# Patient Record
Sex: MALE | Race: WHITE | Employment: OTHER | URBAN - METROPOLITAN AREA
[De-identification: names, ages, dates, MRNs, and addresses within clinical notes are randomized per-mention and may not be internally consistent; named-entity substitution may affect disease eponyms.]

---

## 2017-03-03 ENCOUNTER — HOSPITAL ENCOUNTER (OUTPATIENT)
Dept: ULTRASOUND IMAGING | Facility: CLINIC | Age: 72
Discharge: HOME | End: 2017-03-03
Attending: FAMILY MEDICINE | Admitting: FAMILY MEDICINE
Payer: MEDICARE

## 2017-03-03 DIAGNOSIS — N50.89 MASS OF RIGHT TESTICLE: ICD-10-CM

## 2017-03-03 PROCEDURE — 93976 VASCULAR STUDY: CPT

## 2019-09-11 ENCOUNTER — MEDICAL CORRESPONDENCE (OUTPATIENT)
Dept: HEALTH INFORMATION MANAGEMENT | Facility: CLINIC | Age: 74
End: 2019-09-11

## 2019-10-01 ENCOUNTER — HOSPITAL ENCOUNTER (OUTPATIENT)
Dept: CARDIOLOGY | Facility: CLINIC | Age: 74
Discharge: HOME | End: 2019-10-01
Attending: FAMILY MEDICINE | Admitting: FAMILY MEDICINE
Payer: MEDICARE

## 2019-10-01 DIAGNOSIS — E78.5 HYPERLIPIDEMIA: ICD-10-CM

## 2019-10-01 PROCEDURE — 75571 CT HRT W/O DYE W/CA TEST: CPT | Mod: GA

## 2019-10-01 PROCEDURE — 75571 CT HRT W/O DYE W/CA TEST: CPT | Mod: 26 | Performed by: INTERNAL MEDICINE

## 2021-05-19 ENCOUNTER — APPOINTMENT (OUTPATIENT)
Dept: URBAN - METROPOLITAN AREA CLINIC 253 | Age: 76
Setting detail: DERMATOLOGY
End: 2021-05-21

## 2021-05-19 VITALS — RESPIRATION RATE: 14 BRPM | HEIGHT: 72 IN | WEIGHT: 235 LBS

## 2021-05-19 DIAGNOSIS — D36.1 BENIGN NEOPLASM OF PERIPHERAL NERVES AND AUTONOMIC NERVOUS SYSTEM: ICD-10-CM

## 2021-05-19 DIAGNOSIS — L98.8 OTHER SPECIFIED DISORDERS OF THE SKIN AND SUBCUTANEOUS TISSUE: ICD-10-CM

## 2021-05-19 DIAGNOSIS — L663 OTHER SPECIFIED DISEASES OF HAIR AND HAIR FOLLICLES: ICD-10-CM

## 2021-05-19 DIAGNOSIS — L738 OTHER SPECIFIED DISEASES OF HAIR AND HAIR FOLLICLES: ICD-10-CM

## 2021-05-19 PROBLEM — D23.72 OTHER BENIGN NEOPLASM OF SKIN OF LEFT LOWER LIMB, INCLUDING HIP: Status: ACTIVE | Noted: 2021-05-19

## 2021-05-19 PROBLEM — L02.92 FURUNCLE, UNSPECIFIED: Status: ACTIVE | Noted: 2021-05-19

## 2021-05-19 PROBLEM — D36.11 BENIGN NEOPLASM OF PERIPHERAL NERVES AND AUTONOMIC NERVOUS SYSTEM OF FACE, HEAD, AND NECK: Status: ACTIVE | Noted: 2021-05-19

## 2021-05-19 PROCEDURE — 99203 OFFICE O/P NEW LOW 30 MIN: CPT

## 2021-05-19 PROCEDURE — OTHER PRESCRIPTION: OTHER

## 2021-05-19 PROCEDURE — OTHER PATIENT SPECIFIC COUNSELING: OTHER

## 2021-05-19 PROCEDURE — OTHER EDUCATIONAL RESOURCES PROVIDED: OTHER

## 2021-05-19 PROCEDURE — OTHER COUNSELING: OTHER

## 2021-05-19 RX ORDER — UREA 400 MG/G
40% CREAM TOPICAL BID
Qty: 1 | Refills: 0 | Status: ERX | COMMUNITY
Start: 2021-05-19

## 2021-05-19 ASSESSMENT — LOCATION DETAILED DESCRIPTION DERM
LOCATION DETAILED: RIGHT INFERIOR CENTRAL MALAR CHEEK
LOCATION DETAILED: LEFT KNEE

## 2021-05-19 ASSESSMENT — LOCATION SIMPLE DESCRIPTION DERM
LOCATION SIMPLE: RIGHT CHEEK
LOCATION SIMPLE: LEFT KNEE

## 2021-05-19 ASSESSMENT — LOCATION ZONE DERM
LOCATION ZONE: FACE
LOCATION ZONE: LEG

## 2021-05-19 NOTE — PROCEDURE: PATIENT SPECIFIC COUNSELING
Recommended a topical medication to help exfoliate the skin. \\nOffered a skin biopsy to confirm dx. He declined today. \\nHe reports crossing his legs often.
Detail Level: Simple

## 2021-05-19 NOTE — HPI: SKIN LESION
What Type Of Note Output Would You Prefer (Optional)?: Standard Output
How Severe Is Your Skin Lesion?: moderate
Has Your Skin Lesion Been Treated?: not been treated
Is This A New Presentation, Or A Follow-Up?: Skin Lesion
Additional History: Not itchy or painful. it just continues to grow.

## 2021-05-19 NOTE — PROCEDURE: COUNSELING
Detail Level: Detailed
Patient Specific Counseling (Will Not Stick From Patient To Patient): Reassured him that it is a benign lesion.\\nDiscussed shaving it off if it wants removed.
Patient Specific Counseling (Will Not Stick From Patient To Patient): Discussed shave biopsy. He has some upcoming events and will return to have it removed.
Detail Level: Simple
Patient Specific Counseling (Will Not Stick From Patient To Patient): He states it is asymptomatic.

## 2021-06-03 ENCOUNTER — APPOINTMENT (OUTPATIENT)
Dept: URBAN - METROPOLITAN AREA CLINIC 253 | Age: 76
Setting detail: DERMATOLOGY
End: 2021-06-04

## 2021-06-03 VITALS — RESPIRATION RATE: 14 BRPM | WEIGHT: 235 LBS | HEIGHT: 72 IN

## 2021-06-03 DIAGNOSIS — D36.1 BENIGN NEOPLASM OF PERIPHERAL NERVES AND AUTONOMIC NERVOUS SYSTEM: ICD-10-CM

## 2021-06-03 PROBLEM — D48.5 NEOPLASM OF UNCERTAIN BEHAVIOR OF SKIN: Status: ACTIVE | Noted: 2021-06-03

## 2021-06-03 PROCEDURE — 88305 TISSUE EXAM BY PATHOLOGIST: CPT

## 2021-06-03 PROCEDURE — 11103 TANGNTL BX SKIN EA SEP/ADDL: CPT

## 2021-06-03 PROCEDURE — OTHER PATHOLOGY BILLING: OTHER

## 2021-06-03 PROCEDURE — 11102 TANGNTL BX SKIN SINGLE LES: CPT

## 2021-06-03 PROCEDURE — OTHER COUNSELING: OTHER

## 2021-06-03 PROCEDURE — OTHER BIOPSY BY SHAVE METHOD: OTHER

## 2021-06-03 ASSESSMENT — LOCATION DETAILED DESCRIPTION DERM: LOCATION DETAILED: RIGHT INFERIOR CENTRAL MALAR CHEEK

## 2021-06-03 ASSESSMENT — LOCATION SIMPLE DESCRIPTION DERM: LOCATION SIMPLE: RIGHT CHEEK

## 2021-06-03 ASSESSMENT — LOCATION ZONE DERM: LOCATION ZONE: FACE

## 2021-06-03 NOTE — PROCEDURE: BIOPSY BY SHAVE METHOD
Was A Bandage Applied: Yes
Additional Anesthesia Volume In Cc (Will Not Render If 0): 0
Validate Note Data (See Information Below): No
Hemostasis: Drysol
Biopsy Type: H and E
Silver Nitrate Text: The wound bed was treated with silver nitrate after the biopsy was performed.
Consent: Written consent was obtained and risks were reviewed including but not limited to scarring, infection, bleeding, scabbing, incomplete removal, nerve damage and allergy to anesthesia.
Detail Level: Detailed
Billing Type: Client Bill
Biopsy Method: Dermablade
Electrodesiccation And Curettage Text: The wound bed was treated with electrodesiccation and curettage after the biopsy was performed.
Anesthesia Type: 2% lidocaine with epinephrine and a 1:10 solution of 8.4% sodium bicarbonate
Notification Instructions: Patient will be notified of biopsy results. However, patient instructed to call the office if not contacted within 2 weeks.
Wound Care: Petrolatum
Electrodesiccation Text: The wound bed was treated with electrodesiccation after the biopsy was performed.
Post-Care Instructions: I reviewed with the patient in detail post-care instructions. Patient is to keep the biopsy site dry overnight, and then apply bacitracin twice daily until healed. Patient may apply hydrogen peroxide soaks to remove any crusting.
Type Of Destruction Used: Curettage
Depth Of Biopsy: dermis
Cryotherapy Text: The wound bed was treated with cryotherapy after the biopsy was performed.
Anesthesia Volume In Cc (Will Not Render If 0): 0.3
Information: Selecting Yes will display possible errors in your note based on the variables you have selected. This validation is only offered as a suggestion for you. PLEASE NOTE THAT THE VALIDATION TEXT WILL BE REMOVED WHEN YOU FINALIZE YOUR NOTE. IF YOU WANT TO FAX A PRELIMINARY NOTE YOU WILL NEED TO TOGGLE THIS TO 'NO' IF YOU DO NOT WANT IT IN YOUR FAXED NOTE.
Dressing: bandage
Curettage Text: The wound bed was treated with curettage after the biopsy was performed.
Billing Type: Third-Party Bill

## 2021-06-03 NOTE — PROCEDURE: PATHOLOGY BILLING
Immunohistochemistry ([id number] and [id number]) billing is not performed here. Please use the Immunohistochemistry Stain Billing plan to accomplish this. Immunohistochemistry (69511 and 30280) billing is not performed here. Please use the Immunohistochemistry Stain Billing plan to accomplish this.

## 2021-06-03 NOTE — PROCEDURE: PATHOLOGY BILLING
Immunohistochemistry ([id number] and [id number]) billing is not performed here. Please use the Immunohistochemistry Stain Billing plan to accomplish this.

## 2021-09-01 ENCOUNTER — APPOINTMENT (OUTPATIENT)
Dept: URBAN - METROPOLITAN AREA CLINIC 253 | Age: 76
Setting detail: DERMATOLOGY
End: 2021-09-01

## 2021-09-01 VITALS — HEIGHT: 72 IN | RESPIRATION RATE: 15 BRPM | WEIGHT: 225 LBS

## 2021-09-01 DIAGNOSIS — L82.1 OTHER SEBORRHEIC KERATOSIS: ICD-10-CM

## 2021-09-01 DIAGNOSIS — D22 MELANOCYTIC NEVI: ICD-10-CM

## 2021-09-01 DIAGNOSIS — L57.0 ACTINIC KERATOSIS: ICD-10-CM

## 2021-09-01 DIAGNOSIS — Z71.89 OTHER SPECIFIED COUNSELING: ICD-10-CM

## 2021-09-01 DIAGNOSIS — L81.4 OTHER MELANIN HYPERPIGMENTATION: ICD-10-CM

## 2021-09-01 DIAGNOSIS — D18.0 HEMANGIOMA: ICD-10-CM

## 2021-09-01 DIAGNOSIS — L98.8 OTHER SPECIFIED DISORDERS OF THE SKIN AND SUBCUTANEOUS TISSUE: ICD-10-CM

## 2021-09-01 PROBLEM — D48.5 NEOPLASM OF UNCERTAIN BEHAVIOR OF SKIN: Status: ACTIVE | Noted: 2021-09-01

## 2021-09-01 PROBLEM — D22.5 MELANOCYTIC NEVI OF TRUNK: Status: ACTIVE | Noted: 2021-09-01

## 2021-09-01 PROBLEM — D18.01 HEMANGIOMA OF SKIN AND SUBCUTANEOUS TISSUE: Status: ACTIVE | Noted: 2021-09-01

## 2021-09-01 PROCEDURE — 99213 OFFICE O/P EST LOW 20 MIN: CPT | Mod: 25

## 2021-09-01 PROCEDURE — OTHER LIQUID NITROGEN: OTHER

## 2021-09-01 PROCEDURE — OTHER COUNSELING: OTHER

## 2021-09-01 PROCEDURE — OTHER BIOPSY BY PUNCH METHOD: OTHER

## 2021-09-01 PROCEDURE — 11104 PUNCH BX SKIN SINGLE LESION: CPT

## 2021-09-01 PROCEDURE — 17000 DESTRUCT PREMALG LESION: CPT | Mod: 59

## 2021-09-01 PROCEDURE — 17003 DESTRUCT PREMALG LES 2-14: CPT

## 2021-09-01 PROCEDURE — OTHER PRESCRIPTION: OTHER

## 2021-09-01 RX ORDER — UREA 40 %
40% CREAM (GRAM) TOPICAL BID
Qty: 28 | Refills: 0 | Status: ERX

## 2021-09-01 ASSESSMENT — LOCATION ZONE DERM
LOCATION ZONE: SCALP
LOCATION ZONE: EAR
LOCATION ZONE: TRUNK
LOCATION ZONE: FACE

## 2021-09-01 ASSESSMENT — LOCATION DETAILED DESCRIPTION DERM
LOCATION DETAILED: LEFT SUPERIOR HELIX
LOCATION DETAILED: LEFT CENTRAL ZYGOMA
LOCATION DETAILED: LEFT SUPERIOR PARIETAL SCALP
LOCATION DETAILED: LEFT CENTRAL FRONTAL SCALP
LOCATION DETAILED: INFERIOR THORACIC SPINE
LOCATION DETAILED: RIGHT INFERIOR UPPER BACK
LOCATION DETAILED: RIGHT SUPERIOR OCCIPITAL SCALP

## 2021-09-01 ASSESSMENT — LOCATION SIMPLE DESCRIPTION DERM
LOCATION SIMPLE: UPPER BACK
LOCATION SIMPLE: LEFT EAR
LOCATION SIMPLE: RIGHT OCCIPITAL SCALP
LOCATION SIMPLE: RIGHT UPPER BACK
LOCATION SIMPLE: LEFT SCALP
LOCATION SIMPLE: SCALP
LOCATION SIMPLE: LEFT ZYGOMA

## 2021-09-01 NOTE — PROCEDURE: LIQUID NITROGEN
Duration Of Freeze Thaw-Cycle (Seconds): 3
Render Note In Bullet Format When Appropriate: No
Render Post-Care Instructions In Note?: yes
Post-Care Instructions: I reviewed with the patient in detail post-care instructions. Patient is to wear sunprotection, and avoid picking at any of the treated lesions. Pt may apply Vaseline to crusted or scabbing areas.
Consent: The patient's consent was obtained including but not limited to risks of crusting, scabbing, blistering, scarring, darker or lighter pigmentary change, recurrence, incomplete removal and infection.
Detail Level: Detailed
Number Of Freeze-Thaw Cycles: 2 freeze-thaw cycles

## 2021-09-01 NOTE — PROCEDURE: BIOPSY BY PUNCH METHOD
Bill 80346 For Specimen Handling/Conveyance To Laboratory?: no
Anesthesia Type: 2% lidocaine with epinephrine and a 1:10 solution of 8.4% sodium bicarbonate
Biopsy Type: H and E
Detail Level: Detailed
Suture Removal: 14 days
Anesthesia Volume In Cc (Will Not Render If 0): 0.5
Billing Type: Third-Party Bill
Was A Bandage Applied: Yes
Additional Anesthesia Volume In Cc (Will Not Render If 0): 0
Notification Instructions: Patient will be notified of biopsy results. However, patient instructed to call the office if not contacted within 2 weeks.
Punch Size In Mm: 5
Wound Care: Petrolatum
Hemostasis: None
Home Suture Removal Text: Patient was provided a home suture removal kit and will remove their sutures at home.  If they have any questions or difficulties they will call the office.
Consent: Written consent was obtained and risks were reviewed including but not limited to scarring, infection, bleeding, scabbing, incomplete removal, nerve damage and allergy to anesthesia.
Information: Selecting Yes will display possible errors in your note based on the variables you have selected. This validation is only offered as a suggestion for you. PLEASE NOTE THAT THE VALIDATION TEXT WILL BE REMOVED WHEN YOU FINALIZE YOUR NOTE. IF YOU WANT TO FAX A PRELIMINARY NOTE YOU WILL NEED TO TOGGLE THIS TO 'NO' IF YOU DO NOT WANT IT IN YOUR FAXED NOTE.
Epidermal Sutures: 3-0 Nylon
Dressing: bandage
Post-Care Instructions: I reviewed with the patient in detail post-care instructions. Patient is to keep the biopsy site dry overnight, and then apply bacitracin twice daily until healed. Patient may apply hydrogen peroxide soaks to remove any crusting.

## 2021-09-15 ENCOUNTER — APPOINTMENT (OUTPATIENT)
Dept: URBAN - METROPOLITAN AREA CLINIC 253 | Age: 76
Setting detail: DERMATOLOGY
End: 2021-09-15

## 2021-09-15 DIAGNOSIS — Z48.02 ENCOUNTER FOR REMOVAL OF SUTURES: ICD-10-CM

## 2021-09-15 PROCEDURE — OTHER COUNSELING: OTHER

## 2021-09-15 ASSESSMENT — LOCATION DETAILED DESCRIPTION DERM: LOCATION DETAILED: RIGHT INFERIOR UPPER BACK

## 2021-09-15 ASSESSMENT — LOCATION SIMPLE DESCRIPTION DERM: LOCATION SIMPLE: RIGHT UPPER BACK

## 2021-09-15 ASSESSMENT — LOCATION ZONE DERM: LOCATION ZONE: TRUNK

## 2021-11-10 ENCOUNTER — APPOINTMENT (OUTPATIENT)
Dept: URBAN - METROPOLITAN AREA CLINIC 253 | Age: 76
Setting detail: DERMATOLOGY
End: 2021-11-11

## 2021-11-10 VITALS — WEIGHT: 225 LBS | RESPIRATION RATE: 14 BRPM | HEIGHT: 72 IN

## 2021-11-10 DIAGNOSIS — L81.8 OTHER SPECIFIED DISORDERS OF PIGMENTATION: ICD-10-CM

## 2021-11-10 DIAGNOSIS — L90.5 SCAR CONDITIONS AND FIBROSIS OF SKIN: ICD-10-CM

## 2021-11-10 PROCEDURE — 99212 OFFICE O/P EST SF 10 MIN: CPT

## 2021-11-10 PROCEDURE — OTHER MIPS QUALITY: OTHER

## 2021-11-10 PROCEDURE — OTHER COUNSELING: OTHER

## 2021-11-10 ASSESSMENT — LOCATION SIMPLE DESCRIPTION DERM: LOCATION SIMPLE: RIGHT UPPER BACK

## 2021-11-10 ASSESSMENT — LOCATION ZONE DERM: LOCATION ZONE: TRUNK

## 2021-11-10 ASSESSMENT — LOCATION DETAILED DESCRIPTION DERM: LOCATION DETAILED: RIGHT INFERIOR UPPER BACK

## 2022-05-05 ENCOUNTER — APPOINTMENT (OUTPATIENT)
Dept: URBAN - METROPOLITAN AREA CLINIC 253 | Age: 77
Setting detail: DERMATOLOGY
End: 2022-05-05

## 2022-05-05 VITALS — WEIGHT: 225 LBS | HEIGHT: 72 IN

## 2022-05-05 DIAGNOSIS — L91.8 OTHER HYPERTROPHIC DISORDERS OF THE SKIN: ICD-10-CM

## 2022-05-05 DIAGNOSIS — Z71.89 OTHER SPECIFIED COUNSELING: ICD-10-CM

## 2022-05-05 DIAGNOSIS — D18.0 HEMANGIOMA: ICD-10-CM

## 2022-05-05 DIAGNOSIS — D22 MELANOCYTIC NEVI: ICD-10-CM

## 2022-05-05 DIAGNOSIS — L82.1 OTHER SEBORRHEIC KERATOSIS: ICD-10-CM

## 2022-05-05 DIAGNOSIS — L81.4 OTHER MELANIN HYPERPIGMENTATION: ICD-10-CM

## 2022-05-05 DIAGNOSIS — Z85.828 PERSONAL HISTORY OF OTHER MALIGNANT NEOPLASM OF SKIN: ICD-10-CM

## 2022-05-05 PROBLEM — D22.62 MELANOCYTIC NEVI OF LEFT UPPER LIMB, INCLUDING SHOULDER: Status: ACTIVE | Noted: 2022-05-05

## 2022-05-05 PROBLEM — D18.01 HEMANGIOMA OF SKIN AND SUBCUTANEOUS TISSUE: Status: ACTIVE | Noted: 2022-05-05

## 2022-05-05 PROCEDURE — 99213 OFFICE O/P EST LOW 20 MIN: CPT

## 2022-05-05 PROCEDURE — OTHER MIPS QUALITY: OTHER

## 2022-05-05 PROCEDURE — OTHER COUNSELING: OTHER

## 2022-05-05 ASSESSMENT — LOCATION DETAILED DESCRIPTION DERM
LOCATION DETAILED: SUPRAPUBIC SKIN
LOCATION DETAILED: RIGHT INFERIOR MEDIAL UPPER BACK
LOCATION DETAILED: LEFT VENTRAL PROXIMAL FOREARM

## 2022-05-05 ASSESSMENT — LOCATION SIMPLE DESCRIPTION DERM
LOCATION SIMPLE: RIGHT UPPER BACK
LOCATION SIMPLE: GROIN
LOCATION SIMPLE: LEFT FOREARM

## 2022-05-05 ASSESSMENT — LOCATION ZONE DERM
LOCATION ZONE: ARM
LOCATION ZONE: TRUNK

## 2022-11-09 ENCOUNTER — APPOINTMENT (OUTPATIENT)
Dept: URBAN - METROPOLITAN AREA CLINIC 253 | Age: 77
Setting detail: DERMATOLOGY
End: 2022-11-14

## 2022-11-09 VITALS — WEIGHT: 230 LBS | HEIGHT: 72 IN

## 2022-11-09 DIAGNOSIS — Z85.828 PERSONAL HISTORY OF OTHER MALIGNANT NEOPLASM OF SKIN: ICD-10-CM

## 2022-11-09 DIAGNOSIS — L57.0 ACTINIC KERATOSIS: ICD-10-CM

## 2022-11-09 DIAGNOSIS — L82.1 OTHER SEBORRHEIC KERATOSIS: ICD-10-CM

## 2022-11-09 DIAGNOSIS — D22 MELANOCYTIC NEVI: ICD-10-CM

## 2022-11-09 DIAGNOSIS — Z71.89 OTHER SPECIFIED COUNSELING: ICD-10-CM

## 2022-11-09 DIAGNOSIS — D18.0 HEMANGIOMA: ICD-10-CM

## 2022-11-09 DIAGNOSIS — L81.4 OTHER MELANIN HYPERPIGMENTATION: ICD-10-CM

## 2022-11-09 DIAGNOSIS — D49.2 NEOPLASM OF UNSPECIFIED BEHAVIOR OF BONE, SOFT TISSUE, AND SKIN: ICD-10-CM

## 2022-11-09 PROBLEM — D22.62 MELANOCYTIC NEVI OF LEFT UPPER LIMB, INCLUDING SHOULDER: Status: ACTIVE | Noted: 2022-11-09

## 2022-11-09 PROBLEM — D18.01 HEMANGIOMA OF SKIN AND SUBCUTANEOUS TISSUE: Status: ACTIVE | Noted: 2022-11-09

## 2022-11-09 PROCEDURE — 99213 OFFICE O/P EST LOW 20 MIN: CPT | Mod: 25

## 2022-11-09 PROCEDURE — 11102 TANGNTL BX SKIN SINGLE LES: CPT

## 2022-11-09 PROCEDURE — OTHER COUNSELING: OTHER

## 2022-11-09 PROCEDURE — OTHER MIPS QUALITY: OTHER

## 2022-11-09 PROCEDURE — OTHER LIQUID NITROGEN: OTHER

## 2022-11-09 PROCEDURE — OTHER BIOPSY BY SHAVE METHOD: OTHER

## 2022-11-09 PROCEDURE — OTHER ADDITIONAL NOTES: OTHER

## 2022-11-09 PROCEDURE — 17000 DESTRUCT PREMALG LESION: CPT | Mod: 59

## 2022-11-09 ASSESSMENT — LOCATION ZONE DERM
LOCATION ZONE: NOSE
LOCATION ZONE: ARM
LOCATION ZONE: TRUNK

## 2022-11-09 ASSESSMENT — LOCATION DETAILED DESCRIPTION DERM
LOCATION DETAILED: NASAL TIP
LOCATION DETAILED: RIGHT INFERIOR MEDIAL UPPER BACK
LOCATION DETAILED: NASAL DORSUM
LOCATION DETAILED: LEFT VENTRAL PROXIMAL FOREARM

## 2022-11-09 ASSESSMENT — LOCATION SIMPLE DESCRIPTION DERM
LOCATION SIMPLE: LEFT FOREARM
LOCATION SIMPLE: NOSE
LOCATION SIMPLE: RIGHT UPPER BACK

## 2022-11-09 NOTE — PROCEDURE: LIQUID NITROGEN
Show Aperture Variable?: Yes
Post-Care Instructions: I reviewed with the patient in detail post-care instructions. Patient is to wear sunprotection, and avoid picking at any of the treated lesions. Pt may apply Vaseline to crusted or scabbing areas.
Number Of Freeze-Thaw Cycles: 3 freeze-thaw cycles
Render Note In Bullet Format When Appropriate: No
Detail Level: Detailed
Consent: The patient's consent was obtained including but not limited to risks of crusting, scabbing, blistering, scarring, darker or lighter pigmentary change, recurrence, incomplete removal and infection.
Duration Of Freeze Thaw-Cycle (Seconds): 2

## 2022-11-09 NOTE — PROCEDURE: ADDITIONAL NOTES
Additional Notes: Information on SRT and Mohs given.
Render Risk Assessment In Note?: no
Detail Level: Detailed

## 2022-11-09 NOTE — PROCEDURE: BIOPSY BY SHAVE METHOD
Hide Anticipated Plan (Based On Presumed Biopsy Results)?: No
Anesthesia Type: 2% lidocaine with epinephrine and a 1:10 solution of 8.4% sodium bicarbonate
Dressing: bandage
Was A Bandage Applied: Yes
Billing Type: Third-Party Bill
Electrodesiccation Text: The wound bed was treated with electrodesiccation after the biopsy was performed.
Anesthesia Volume In Cc (Will Not Render If 0): 0.3
Hemostasis: Drysol
Information: Selecting Yes will display possible errors in your note based on the variables you have selected. This validation is only offered as a suggestion for you. PLEASE NOTE THAT THE VALIDATION TEXT WILL BE REMOVED WHEN YOU FINALIZE YOUR NOTE. IF YOU WANT TO FAX A PRELIMINARY NOTE YOU WILL NEED TO TOGGLE THIS TO 'NO' IF YOU DO NOT WANT IT IN YOUR FAXED NOTE.
X Size Of Lesion In Cm: 0
Depth Of Biopsy: dermis
Curettage Text: The wound bed was treated with curettage after the biopsy was performed.
Biopsy Method: Dermablade
Silver Nitrate Text: The wound bed was treated with silver nitrate after the biopsy was performed.
Wound Care: Petrolatum
Notification Instructions: Patient will be notified of biopsy results. However, patient instructed to call the office if not contacted within 2 weeks.
Type Of Destruction Used: Curettage
Electrodesiccation And Curettage Text: The wound bed was treated with electrodesiccation and curettage after the biopsy was performed.
Biopsy Type: H and E
Post-Care Instructions: I reviewed with the patient in detail post-care instructions. Patient is to keep the biopsy site dry overnight, and then apply bacitracin twice daily until healed. Patient may apply hydrogen peroxide soaks to remove any crusting.
Consent: Written consent was obtained and risks were reviewed including but not limited to scarring, infection, bleeding, scabbing, incomplete removal, nerve damage and allergy to anesthesia.
Cryotherapy Text: The wound bed was treated with cryotherapy after the biopsy was performed.
Detail Level: Detailed

## 2023-05-11 ENCOUNTER — APPOINTMENT (OUTPATIENT)
Dept: URBAN - METROPOLITAN AREA CLINIC 253 | Age: 78
Setting detail: DERMATOLOGY
End: 2023-05-19

## 2023-05-11 VITALS — RESPIRATION RATE: 14 BRPM | WEIGHT: 225 LBS | HEIGHT: 72 IN

## 2023-05-11 DIAGNOSIS — Z71.89 OTHER SPECIFIED COUNSELING: ICD-10-CM

## 2023-05-11 DIAGNOSIS — L81.4 OTHER MELANIN HYPERPIGMENTATION: ICD-10-CM

## 2023-05-11 DIAGNOSIS — D18.0 HEMANGIOMA: ICD-10-CM

## 2023-05-11 DIAGNOSIS — D22 MELANOCYTIC NEVI: ICD-10-CM

## 2023-05-11 DIAGNOSIS — L82.1 OTHER SEBORRHEIC KERATOSIS: ICD-10-CM

## 2023-05-11 PROBLEM — D18.01 HEMANGIOMA OF SKIN AND SUBCUTANEOUS TISSUE: Status: ACTIVE | Noted: 2023-05-11

## 2023-05-11 PROBLEM — D22.5 MELANOCYTIC NEVI OF TRUNK: Status: ACTIVE | Noted: 2023-05-11

## 2023-05-11 PROCEDURE — 99213 OFFICE O/P EST LOW 20 MIN: CPT

## 2023-05-11 PROCEDURE — OTHER MIPS QUALITY: OTHER

## 2023-05-11 PROCEDURE — OTHER COUNSELING: OTHER

## 2023-05-11 ASSESSMENT — LOCATION DETAILED DESCRIPTION DERM: LOCATION DETAILED: INFERIOR THORACIC SPINE

## 2023-05-11 ASSESSMENT — LOCATION SIMPLE DESCRIPTION DERM: LOCATION SIMPLE: UPPER BACK

## 2023-05-11 ASSESSMENT — LOCATION ZONE DERM: LOCATION ZONE: TRUNK

## 2023-05-11 NOTE — PROCEDURE: MIPS QUALITY
Quality 431: Preventive Care And Screening: Unhealthy Alcohol Use - Screening: Patient not identified as an unhealthy alcohol user when screened for unhealthy alcohol use using a systematic screening method
Quality 130: Documentation Of Current Medications In The Medical Record: Current Medications Documented
Quality 226: Preventive Care And Screening: Tobacco Use: Screening And Cessation Intervention: Patient screened for tobacco use and is an ex/non-smoker
Quality 111:Pneumonia Vaccination Status For Older Adults: Patient received any pneumococcal conjugate or polysaccharide vaccine on or after their 60th birthday and before the end of the measurement period
Detail Level: Detailed
Quality 110: Preventive Care And Screening: Influenza Immunization: Influenza Immunization previously received during influenza season

## 2023-11-09 ENCOUNTER — APPOINTMENT (OUTPATIENT)
Dept: URBAN - METROPOLITAN AREA CLINIC 253 | Age: 78
Setting detail: DERMATOLOGY
End: 2023-11-13

## 2023-11-09 VITALS — HEIGHT: 72 IN | RESPIRATION RATE: 14 BRPM | WEIGHT: 225 LBS

## 2023-11-09 DIAGNOSIS — D22 MELANOCYTIC NEVI: ICD-10-CM

## 2023-11-09 DIAGNOSIS — Z71.89 OTHER SPECIFIED COUNSELING: ICD-10-CM

## 2023-11-09 DIAGNOSIS — L81.4 OTHER MELANIN HYPERPIGMENTATION: ICD-10-CM

## 2023-11-09 DIAGNOSIS — L82.0 INFLAMED SEBORRHEIC KERATOSIS: ICD-10-CM

## 2023-11-09 DIAGNOSIS — D18.0 HEMANGIOMA: ICD-10-CM

## 2023-11-09 DIAGNOSIS — L57.0 ACTINIC KERATOSIS: ICD-10-CM

## 2023-11-09 DIAGNOSIS — D36.1 BENIGN NEOPLASM OF PERIPHERAL NERVES AND AUTONOMIC NERVOUS SYSTEM: ICD-10-CM

## 2023-11-09 DIAGNOSIS — L82.1 OTHER SEBORRHEIC KERATOSIS: ICD-10-CM

## 2023-11-09 PROBLEM — D36.14 BENIGN NEOPLASM OF PERIPHERAL NERVES AND AUTONOMIC NERVOUS SYSTEM OF THORAX: Status: ACTIVE | Noted: 2023-11-09

## 2023-11-09 PROBLEM — D18.01 HEMANGIOMA OF SKIN AND SUBCUTANEOUS TISSUE: Status: ACTIVE | Noted: 2023-11-09

## 2023-11-09 PROBLEM — D36.12 BENIGN NEOPLASM OF PERIPHERAL NERVES AND AUTONOMIC NERVOUS SYSTEM, UPPER LIMB, INCLUDING SHOULDER: Status: ACTIVE | Noted: 2023-11-09

## 2023-11-09 PROBLEM — D22.5 MELANOCYTIC NEVI OF TRUNK: Status: ACTIVE | Noted: 2023-11-09

## 2023-11-09 PROBLEM — D23.72 OTHER BENIGN NEOPLASM OF SKIN OF LEFT LOWER LIMB, INCLUDING HIP: Status: ACTIVE | Noted: 2023-11-09

## 2023-11-09 PROCEDURE — 17003 DESTRUCT PREMALG LES 2-14: CPT

## 2023-11-09 PROCEDURE — OTHER COUNSELING: OTHER

## 2023-11-09 PROCEDURE — 99213 OFFICE O/P EST LOW 20 MIN: CPT | Mod: 25

## 2023-11-09 PROCEDURE — OTHER LIQUID NITROGEN: OTHER

## 2023-11-09 PROCEDURE — 17000 DESTRUCT PREMALG LESION: CPT

## 2023-11-09 PROCEDURE — OTHER MIPS QUALITY: OTHER

## 2023-11-09 ASSESSMENT — LOCATION SIMPLE DESCRIPTION DERM
LOCATION SIMPLE: LEFT SHOULDER
LOCATION SIMPLE: RIGHT UPPER BACK
LOCATION SIMPLE: RIGHT CHEEK
LOCATION SIMPLE: LEFT UPPER BACK
LOCATION SIMPLE: NOSE
LOCATION SIMPLE: LOWER BACK
LOCATION SIMPLE: UPPER BACK

## 2023-11-09 ASSESSMENT — LOCATION DETAILED DESCRIPTION DERM
LOCATION DETAILED: INFERIOR THORACIC SPINE
LOCATION DETAILED: LEFT INFERIOR LATERAL UPPER BACK
LOCATION DETAILED: RIGHT INFERIOR LATERAL UPPER BACK
LOCATION DETAILED: SUPERIOR LUMBAR SPINE
LOCATION DETAILED: LEFT SUPERIOR LATERAL UPPER BACK
LOCATION DETAILED: RIGHT SUPERIOR LATERAL MALAR CHEEK
LOCATION DETAILED: LEFT POSTERIOR SHOULDER
LOCATION DETAILED: NASAL DORSUM

## 2023-11-09 ASSESSMENT — LOCATION ZONE DERM
LOCATION ZONE: FACE
LOCATION ZONE: ARM
LOCATION ZONE: TRUNK
LOCATION ZONE: NOSE

## 2024-08-05 ENCOUNTER — APPOINTMENT (OUTPATIENT)
Dept: URBAN - METROPOLITAN AREA CLINIC 253 | Age: 79
Setting detail: DERMATOLOGY
End: 2024-08-07

## 2024-08-05 VITALS — WEIGHT: 225 LBS | HEIGHT: 71 IN | RESPIRATION RATE: 14 BRPM

## 2024-08-05 DIAGNOSIS — L82.0 INFLAMED SEBORRHEIC KERATOSIS: ICD-10-CM

## 2024-08-05 DIAGNOSIS — Z85.828 PERSONAL HISTORY OF OTHER MALIGNANT NEOPLASM OF SKIN: ICD-10-CM

## 2024-08-05 PROCEDURE — 17110 DESTRUCT B9 LESION 1-14: CPT

## 2024-08-05 PROCEDURE — 99213 OFFICE O/P EST LOW 20 MIN: CPT | Mod: 25

## 2024-08-05 PROCEDURE — OTHER LIQUID NITROGEN: OTHER

## 2024-08-05 PROCEDURE — OTHER PHOTO-DOCUMENTATION: OTHER

## 2024-08-05 PROCEDURE — OTHER COUNSELING: OTHER

## 2024-08-05 ASSESSMENT — LOCATION SIMPLE DESCRIPTION DERM
LOCATION SIMPLE: RIGHT SCALP
LOCATION SIMPLE: LEFT SCALP

## 2024-08-05 ASSESSMENT — LOCATION ZONE DERM: LOCATION ZONE: SCALP

## 2024-08-05 ASSESSMENT — LOCATION DETAILED DESCRIPTION DERM
LOCATION DETAILED: LEFT CENTRAL FRONTAL SCALP
LOCATION DETAILED: RIGHT CENTRAL FRONTAL SCALP

## 2024-08-05 NOTE — PROCEDURE: LIQUID NITROGEN
Include Z78.9 (Other Specified Conditions Influencing Health Status) As An Associated Diagnosis?: No
Spray Paint Text: The liquid nitrogen was applied to the skin utilizing a spray paint frosting technique.
Medical Necessity Clause: This procedure was medically necessary because the lesions that were treated were:
Consent: The patient's consent was obtained including but not limited to risks of crusting, scabbing, blistering, scarring, darker or lighter pigmentary change, recurrence, incomplete removal and infection.
Show Topical Anesthesia Variable?: Yes
Post-Care Instructions: I reviewed with the patient in detail post-care instructions. Patient is to wear sunprotection, and avoid picking at any of the treated lesions. Pt may apply Vaseline to crusted or scabbing areas.
Detail Level: Detailed
Medical Necessity Information: It is in your best interest to select a reason for this procedure from the list below. All of these items fulfill various CMS LCD requirements except the new and changing color options.
Duration Of Freeze Thaw-Cycle (Seconds): 2
Number Of Freeze-Thaw Cycles: 3 freeze-thaw cycles

## 2024-08-29 ENCOUNTER — APPOINTMENT (OUTPATIENT)
Dept: URBAN - METROPOLITAN AREA CLINIC 253 | Age: 79
Setting detail: DERMATOLOGY
End: 2024-09-03

## 2024-08-29 VITALS — RESPIRATION RATE: 14 BRPM | WEIGHT: 225 LBS | HEIGHT: 72 IN

## 2024-08-29 DIAGNOSIS — L82.1 OTHER SEBORRHEIC KERATOSIS: ICD-10-CM

## 2024-08-29 DIAGNOSIS — Z71.89 OTHER SPECIFIED COUNSELING: ICD-10-CM

## 2024-08-29 DIAGNOSIS — D18.0 HEMANGIOMA: ICD-10-CM

## 2024-08-29 DIAGNOSIS — L81.4 OTHER MELANIN HYPERPIGMENTATION: ICD-10-CM

## 2024-08-29 DIAGNOSIS — D22 MELANOCYTIC NEVI: ICD-10-CM

## 2024-08-29 PROBLEM — D22.4 MELANOCYTIC NEVI OF SCALP AND NECK: Status: ACTIVE | Noted: 2024-08-29

## 2024-08-29 PROBLEM — D22.5 MELANOCYTIC NEVI OF TRUNK: Status: ACTIVE | Noted: 2024-08-29

## 2024-08-29 PROBLEM — D18.01 HEMANGIOMA OF SKIN AND SUBCUTANEOUS TISSUE: Status: ACTIVE | Noted: 2024-08-29

## 2024-08-29 PROCEDURE — OTHER ADDITIONAL NOTES: OTHER

## 2024-08-29 PROCEDURE — OTHER COUNSELING: OTHER

## 2024-08-29 PROCEDURE — 99213 OFFICE O/P EST LOW 20 MIN: CPT

## 2024-08-29 PROCEDURE — OTHER MIPS QUALITY: OTHER

## 2024-08-29 ASSESSMENT — LOCATION SIMPLE DESCRIPTION DERM
LOCATION SIMPLE: UPPER BACK
LOCATION SIMPLE: LOWER BACK
LOCATION SIMPLE: POSTERIOR NECK

## 2024-08-29 ASSESSMENT — LOCATION ZONE DERM
LOCATION ZONE: NECK
LOCATION ZONE: TRUNK

## 2024-08-29 ASSESSMENT — LOCATION DETAILED DESCRIPTION DERM
LOCATION DETAILED: INFERIOR THORACIC SPINE
LOCATION DETAILED: RIGHT POSTERIOR NECK
LOCATION DETAILED: SUPERIOR LUMBAR SPINE

## 2024-08-29 NOTE — PROCEDURE: ADDITIONAL NOTES
Render Risk Assessment In Note?: no
Additional Notes: Discussed option for shave removal, he will consider this option.
Detail Level: Zone

## 2025-04-17 ENCOUNTER — APPOINTMENT (OUTPATIENT)
Dept: URBAN - METROPOLITAN AREA CLINIC 253 | Age: 80
Setting detail: DERMATOLOGY
End: 2025-04-23

## 2025-04-17 VITALS — RESPIRATION RATE: 14 BRPM | HEIGHT: 72 IN | WEIGHT: 220 LBS

## 2025-04-17 DIAGNOSIS — D18.0 HEMANGIOMA: ICD-10-CM

## 2025-04-17 DIAGNOSIS — L82.1 OTHER SEBORRHEIC KERATOSIS: ICD-10-CM

## 2025-04-17 DIAGNOSIS — L57.0 ACTINIC KERATOSIS: ICD-10-CM

## 2025-04-17 DIAGNOSIS — Z71.89 OTHER SPECIFIED COUNSELING: ICD-10-CM

## 2025-04-17 DIAGNOSIS — D22 MELANOCYTIC NEVI: ICD-10-CM

## 2025-04-17 DIAGNOSIS — L81.4 OTHER MELANIN HYPERPIGMENTATION: ICD-10-CM

## 2025-04-17 PROBLEM — D22.5 MELANOCYTIC NEVI OF TRUNK: Status: ACTIVE | Noted: 2025-04-17

## 2025-04-17 PROBLEM — D18.01 HEMANGIOMA OF SKIN AND SUBCUTANEOUS TISSUE: Status: ACTIVE | Noted: 2025-04-17

## 2025-04-17 PROCEDURE — 17000 DESTRUCT PREMALG LESION: CPT

## 2025-04-17 PROCEDURE — OTHER COUNSELING: OTHER

## 2025-04-17 PROCEDURE — OTHER MIPS QUALITY: OTHER

## 2025-04-17 PROCEDURE — OTHER LIQUID NITROGEN: OTHER

## 2025-04-17 PROCEDURE — 99213 OFFICE O/P EST LOW 20 MIN: CPT | Mod: 25

## 2025-04-17 ASSESSMENT — LOCATION SIMPLE DESCRIPTION DERM
LOCATION SIMPLE: UPPER BACK
LOCATION SIMPLE: LEFT CHEEK
LOCATION SIMPLE: LOWER BACK

## 2025-04-17 ASSESSMENT — LOCATION DETAILED DESCRIPTION DERM
LOCATION DETAILED: INFERIOR THORACIC SPINE
LOCATION DETAILED: SUPERIOR LUMBAR SPINE
LOCATION DETAILED: LEFT SUPERIOR LATERAL MALAR CHEEK

## 2025-04-17 ASSESSMENT — LOCATION ZONE DERM
LOCATION ZONE: FACE
LOCATION ZONE: TRUNK

## 2025-07-05 ENCOUNTER — APPOINTMENT (OUTPATIENT)
Dept: GENERAL RADIOLOGY | Facility: CLINIC | Age: 80
DRG: 197 | End: 2025-07-05
Attending: EMERGENCY MEDICINE
Payer: COMMERCIAL

## 2025-07-05 ENCOUNTER — HOSPITAL ENCOUNTER (INPATIENT)
Facility: CLINIC | Age: 80
LOS: 1 days | Discharge: HOME | DRG: 197 | End: 2025-07-07
Attending: EMERGENCY MEDICINE | Admitting: HOSPITALIST
Payer: COMMERCIAL

## 2025-07-05 ENCOUNTER — APPOINTMENT (OUTPATIENT)
Dept: CT IMAGING | Facility: CLINIC | Age: 80
DRG: 197 | End: 2025-07-05
Attending: EMERGENCY MEDICINE
Payer: COMMERCIAL

## 2025-07-05 DIAGNOSIS — R04.2 HEMOPTYSIS: ICD-10-CM

## 2025-07-05 LAB
ABO + RH BLD: NORMAL
ANION GAP SERPL CALCULATED.3IONS-SCNC: 11 MMOL/L (ref 7–15)
BASOPHILS # BLD AUTO: 0 10E3/UL (ref 0–0.2)
BASOPHILS NFR BLD AUTO: 0 %
BLD GP AB SCN SERPL QL: NEGATIVE
BUN SERPL-MCNC: 21.7 MG/DL (ref 8–23)
CALCIUM SERPL-MCNC: 9.2 MG/DL (ref 8.8–10.4)
CHLORIDE SERPL-SCNC: 105 MMOL/L (ref 98–107)
CREAT SERPL-MCNC: 1.11 MG/DL (ref 0.67–1.17)
D DIMER PPP FEU-MCNC: 0.69 UG/ML FEU (ref 0–0.5)
EGFRCR SERPLBLD CKD-EPI 2021: 68 ML/MIN/1.73M2
EOSINOPHIL # BLD AUTO: 0.2 10E3/UL (ref 0–0.7)
EOSINOPHIL NFR BLD AUTO: 2 %
ERYTHROCYTE [DISTWIDTH] IN BLOOD BY AUTOMATED COUNT: 15.2 % (ref 10–15)
GLUCOSE SERPL-MCNC: 106 MG/DL (ref 70–99)
HCO3 SERPL-SCNC: 26 MMOL/L (ref 22–29)
HCT VFR BLD AUTO: 45.5 % (ref 40–53)
HGB BLD-MCNC: 14.7 G/DL (ref 13.3–17.7)
IMM GRANULOCYTES # BLD: 0.1 10E3/UL
IMM GRANULOCYTES NFR BLD: 0 %
INR PPP: 1.05 (ref 0.85–1.15)
LYMPHOCYTES # BLD AUTO: 1.1 10E3/UL (ref 0.8–5.3)
LYMPHOCYTES NFR BLD AUTO: 9 %
MCH RBC QN AUTO: 28.3 PG (ref 26.5–33)
MCHC RBC AUTO-ENTMCNC: 32.3 G/DL (ref 31.5–36.5)
MCV RBC AUTO: 88 FL (ref 78–100)
MONOCYTES # BLD AUTO: 0.7 10E3/UL (ref 0–1.3)
MONOCYTES NFR BLD AUTO: 7 %
NEUTROPHILS # BLD AUTO: 9.3 10E3/UL (ref 1.6–8.3)
NEUTROPHILS NFR BLD AUTO: 82 %
NRBC # BLD AUTO: 0 10E3/UL
NRBC BLD AUTO-RTO: 0 /100
PLATELET # BLD AUTO: 220 10E3/UL (ref 150–450)
POTASSIUM SERPL-SCNC: 4.9 MMOL/L (ref 3.4–5.3)
PROTHROMBIN TIME: 13.8 SECONDS (ref 11.8–14.8)
RBC # BLD AUTO: 5.19 10E6/UL (ref 4.4–5.9)
SODIUM SERPL-SCNC: 142 MMOL/L (ref 135–145)
SPECIMEN EXP DATE BLD: NORMAL
WBC # BLD AUTO: 11.4 10E3/UL (ref 4–11)

## 2025-07-05 PROCEDURE — 250N000009 HC RX 250: Performed by: EMERGENCY MEDICINE

## 2025-07-05 PROCEDURE — 71046 X-RAY EXAM CHEST 2 VIEWS: CPT

## 2025-07-05 PROCEDURE — 36415 COLL VENOUS BLD VENIPUNCTURE: CPT | Performed by: EMERGENCY MEDICINE

## 2025-07-05 PROCEDURE — 71275 CT ANGIOGRAPHY CHEST: CPT

## 2025-07-05 PROCEDURE — 85379 FIBRIN DEGRADATION QUANT: CPT | Performed by: EMERGENCY MEDICINE

## 2025-07-05 PROCEDURE — 250N000011 HC RX IP 250 OP 636: Performed by: EMERGENCY MEDICINE

## 2025-07-05 PROCEDURE — 85610 PROTHROMBIN TIME: CPT | Performed by: EMERGENCY MEDICINE

## 2025-07-05 PROCEDURE — 84145 PROCALCITONIN (PCT): CPT | Performed by: HOSPITALIST

## 2025-07-05 PROCEDURE — 80048 BASIC METABOLIC PNL TOTAL CA: CPT | Performed by: EMERGENCY MEDICINE

## 2025-07-05 PROCEDURE — 93005 ELECTROCARDIOGRAM TRACING: CPT

## 2025-07-05 PROCEDURE — 99285 EMERGENCY DEPT VISIT HI MDM: CPT | Mod: 25

## 2025-07-05 PROCEDURE — 85025 COMPLETE CBC W/AUTO DIFF WBC: CPT | Performed by: EMERGENCY MEDICINE

## 2025-07-05 RX ORDER — IOPAMIDOL 755 MG/ML
500 INJECTION, SOLUTION INTRAVASCULAR ONCE
Status: COMPLETED | OUTPATIENT
Start: 2025-07-05 | End: 2025-07-05

## 2025-07-05 RX ADMIN — SODIUM CHLORIDE 100 ML: 9 INJECTION, SOLUTION INTRAVENOUS at 22:30

## 2025-07-05 RX ADMIN — IOPAMIDOL 85 ML: 755 INJECTION, SOLUTION INTRAVENOUS at 22:30

## 2025-07-05 ASSESSMENT — ACTIVITIES OF DAILY LIVING (ADL)
ADLS_ACUITY_SCORE: 41

## 2025-07-05 ASSESSMENT — COLUMBIA-SUICIDE SEVERITY RATING SCALE - C-SSRS
6. HAVE YOU EVER DONE ANYTHING, STARTED TO DO ANYTHING, OR PREPARED TO DO ANYTHING TO END YOUR LIFE?: NO
2. HAVE YOU ACTUALLY HAD ANY THOUGHTS OF KILLING YOURSELF IN THE PAST MONTH?: NO
1. IN THE PAST MONTH, HAVE YOU WISHED YOU WERE DEAD OR WISHED YOU COULD GO TO SLEEP AND NOT WAKE UP?: NO

## 2025-07-06 LAB
% LINING CELLS, BODY FLUID: 2 %
ANION GAP SERPL CALCULATED.3IONS-SCNC: 11 MMOL/L (ref 7–15)
APPEARANCE FLD: ABNORMAL
BACTERIA SPEC CULT: NORMAL
BASOPHILS # BLD AUTO: 0 10E3/UL (ref 0–0.2)
BASOPHILS NFR BLD AUTO: 0 %
BRONCHOSCOPY: NORMAL
BUN SERPL-MCNC: 17.1 MG/DL (ref 8–23)
CALCIUM SERPL-MCNC: 8.7 MG/DL (ref 8.8–10.4)
CHLORIDE SERPL-SCNC: 107 MMOL/L (ref 98–107)
CK SERPL-CCNC: 184 U/L (ref 39–308)
COLOR FLD: ABNORMAL
CREAT SERPL-MCNC: 0.91 MG/DL (ref 0.67–1.17)
CRP SERPL-MCNC: 8.67 MG/L
EGFRCR SERPLBLD CKD-EPI 2021: 87 ML/MIN/1.73M2
EOSINOPHIL # BLD AUTO: 0.1 10E3/UL (ref 0–0.7)
EOSINOPHIL NFR BLD AUTO: 1 %
ERYTHROCYTE [DISTWIDTH] IN BLOOD BY AUTOMATED COUNT: 15.4 % (ref 10–15)
GLUCOSE SERPL-MCNC: 106 MG/DL (ref 70–99)
GRAM STAIN RESULT: NORMAL
GRAM STAIN RESULT: NORMAL
HCO3 SERPL-SCNC: 21 MMOL/L (ref 22–29)
HCT VFR BLD AUTO: 40.5 % (ref 40–53)
HGB BLD-MCNC: 13.2 G/DL (ref 13.3–17.7)
HGB BLD-MCNC: 14.5 G/DL (ref 13.3–17.7)
IMM GRANULOCYTES # BLD: 0.1 10E3/UL
IMM GRANULOCYTES NFR BLD: 1 %
KOH PREPARATION: NORMAL
KOH PREPARATION: NORMAL
LYMPHOCYTES # BLD AUTO: 1.2 10E3/UL (ref 0.8–5.3)
LYMPHOCYTES NFR BLD AUTO: 12 %
LYMPHOCYTES NFR FLD MANUAL: 1 %
MCH RBC QN AUTO: 28.1 PG (ref 26.5–33)
MCHC RBC AUTO-ENTMCNC: 32.6 G/DL (ref 31.5–36.5)
MCV RBC AUTO: 86 FL (ref 78–100)
MCV RBC AUTO: 86 FL (ref 78–100)
MONOCYTES # BLD AUTO: 0.8 10E3/UL (ref 0–1.3)
MONOCYTES NFR BLD AUTO: 8 %
MONOS+MACROS NFR FLD MANUAL: 7 %
NEUTROPHILS # BLD AUTO: 8.3 10E3/UL (ref 1.6–8.3)
NEUTROPHILS NFR BLD AUTO: 79 %
NEUTS BAND NFR FLD MANUAL: 90 %
NRBC # BLD AUTO: 0 10E3/UL
NRBC BLD AUTO-RTO: 0 /100
PLATELET # BLD AUTO: 193 10E3/UL (ref 150–450)
POTASSIUM SERPL-SCNC: 4.1 MMOL/L (ref 3.4–5.3)
PROCALCITONIN SERPL IA-MCNC: 0.03 NG/ML
RBC # BLD AUTO: 4.7 10E6/UL (ref 4.4–5.9)
RHEUMATOID FACT SERPL-ACNC: <10 IU/ML
SODIUM SERPL-SCNC: 139 MMOL/L (ref 135–145)
SPECIMEN SOURCE FLD: ABNORMAL
WBC # BLD AUTO: 10.6 10E3/UL (ref 4–11)
WBC # FLD AUTO: 241 /UL

## 2025-07-06 PROCEDURE — 87102 FUNGUS ISOLATION CULTURE: CPT | Performed by: INTERNAL MEDICINE

## 2025-07-06 PROCEDURE — 86235 NUCLEAR ANTIGEN ANTIBODY: CPT | Performed by: INTERNAL MEDICINE

## 2025-07-06 PROCEDURE — 250N000013 HC RX MED GY IP 250 OP 250 PS 637: Performed by: HOSPITALIST

## 2025-07-06 PROCEDURE — 82550 ASSAY OF CK (CPK): CPT | Performed by: INTERNAL MEDICINE

## 2025-07-06 PROCEDURE — 87581 M.PNEUMON DNA AMP PROBE: CPT | Performed by: INTERNAL MEDICINE

## 2025-07-06 PROCEDURE — 86431 RHEUMATOID FACTOR QUANT: CPT | Performed by: INTERNAL MEDICINE

## 2025-07-06 PROCEDURE — 36415 COLL VENOUS BLD VENIPUNCTURE: CPT | Performed by: HOSPITALIST

## 2025-07-06 PROCEDURE — 86900 BLOOD TYPING SEROLOGIC ABO: CPT | Performed by: EMERGENCY MEDICINE

## 2025-07-06 PROCEDURE — 250N000011 HC RX IP 250 OP 636: Mod: JZ | Performed by: INTERNAL MEDICINE

## 2025-07-06 PROCEDURE — G0500 MOD SEDAT ENDO SERVICE >5YRS: HCPCS | Performed by: INTERNAL MEDICINE

## 2025-07-06 PROCEDURE — 36415 COLL VENOUS BLD VENIPUNCTURE: CPT | Performed by: PHYSICIAN ASSISTANT

## 2025-07-06 PROCEDURE — 80048 BASIC METABOLIC PNL TOTAL CA: CPT | Performed by: HOSPITALIST

## 2025-07-06 PROCEDURE — 250N000009 HC RX 250: Performed by: INTERNAL MEDICINE

## 2025-07-06 PROCEDURE — 94660 CPAP INITIATION&MGMT: CPT

## 2025-07-06 PROCEDURE — 85025 COMPLETE CBC W/AUTO DIFF WBC: CPT | Performed by: HOSPITALIST

## 2025-07-06 PROCEDURE — 99223 1ST HOSP IP/OBS HIGH 75: CPT | Mod: 25 | Performed by: INTERNAL MEDICINE

## 2025-07-06 PROCEDURE — 0B9G8ZZ DRAINAGE OF LEFT UPPER LUNG LOBE, VIA NATURAL OR ARTIFICIAL OPENING ENDOSCOPIC: ICD-10-PCS | Performed by: INTERNAL MEDICINE

## 2025-07-06 PROCEDURE — 85018 HEMOGLOBIN: CPT | Performed by: PHYSICIAN ASSISTANT

## 2025-07-06 PROCEDURE — 94640 AIRWAY INHALATION TREATMENT: CPT

## 2025-07-06 PROCEDURE — 999N000157 HC STATISTIC RCP TIME EA 10 MIN

## 2025-07-06 PROCEDURE — 87385 HISTOPLASMA CAPSUL AG IA: CPT | Performed by: INTERNAL MEDICINE

## 2025-07-06 PROCEDURE — 86140 C-REACTIVE PROTEIN: CPT | Performed by: INTERNAL MEDICINE

## 2025-07-06 PROCEDURE — G0378 HOSPITAL OBSERVATION PER HR: HCPCS

## 2025-07-06 PROCEDURE — 87305 ASPERGILLUS AG IA: CPT | Performed by: INTERNAL MEDICINE

## 2025-07-06 PROCEDURE — 258N000003 HC RX IP 258 OP 636: Performed by: HOSPITALIST

## 2025-07-06 PROCEDURE — 88305 TISSUE EXAM BY PATHOLOGIST: CPT | Mod: TC | Performed by: INTERNAL MEDICINE

## 2025-07-06 PROCEDURE — 120N000001 HC R&B MED SURG/OB

## 2025-07-06 PROCEDURE — 36415 COLL VENOUS BLD VENIPUNCTURE: CPT | Performed by: INTERNAL MEDICINE

## 2025-07-06 PROCEDURE — 31624 DX BRONCHOSCOPE/LAVAGE: CPT | Performed by: INTERNAL MEDICINE

## 2025-07-06 PROCEDURE — 87081 CULTURE SCREEN ONLY: CPT | Performed by: INTERNAL MEDICINE

## 2025-07-06 PROCEDURE — 86200 CCP ANTIBODY: CPT | Performed by: INTERNAL MEDICINE

## 2025-07-06 PROCEDURE — 99222 1ST HOSP IP/OBS MODERATE 55: CPT | Performed by: HOSPITALIST

## 2025-07-06 PROCEDURE — 89050 BODY FLUID CELL COUNT: CPT | Performed by: INTERNAL MEDICINE

## 2025-07-06 PROCEDURE — 87594 PNEUMCYSTS JIROVECII AMP PRB: CPT | Performed by: INTERNAL MEDICINE

## 2025-07-06 PROCEDURE — 87205 SMEAR GRAM STAIN: CPT | Performed by: INTERNAL MEDICINE

## 2025-07-06 PROCEDURE — 87529 HSV DNA AMP PROBE: CPT | Performed by: INTERNAL MEDICINE

## 2025-07-06 PROCEDURE — 87210 SMEAR WET MOUNT SALINE/INK: CPT | Performed by: INTERNAL MEDICINE

## 2025-07-06 PROCEDURE — 86356 MONONUCLEAR CELL ANTIGEN: CPT | Performed by: INTERNAL MEDICINE

## 2025-07-06 PROCEDURE — 87541 LEGION PNEUMO DNA AMP PROB: CPT | Performed by: INTERNAL MEDICINE

## 2025-07-06 PROCEDURE — 87206 SMEAR FLUORESCENT/ACID STAI: CPT | Performed by: INTERNAL MEDICINE

## 2025-07-06 PROCEDURE — 94640 AIRWAY INHALATION TREATMENT: CPT | Mod: 76

## 2025-07-06 PROCEDURE — 36415 COLL VENOUS BLD VENIPUNCTURE: CPT | Performed by: EMERGENCY MEDICINE

## 2025-07-06 PROCEDURE — 83516 IMMUNOASSAY NONANTIBODY: CPT | Performed by: INTERNAL MEDICINE

## 2025-07-06 PROCEDURE — 87070 CULTURE OTHR SPECIMN AEROBIC: CPT | Performed by: INTERNAL MEDICINE

## 2025-07-06 RX ORDER — LIDOCAINE 40 MG/G
CREAM TOPICAL
Status: DISCONTINUED | OUTPATIENT
Start: 2025-07-06 | End: 2025-07-06 | Stop reason: HOSPADM

## 2025-07-06 RX ORDER — LEVOTHYROXINE SODIUM 100 UG/1
100 TABLET ORAL DAILY
Status: DISCONTINUED | OUTPATIENT
Start: 2025-07-07 | End: 2025-07-07 | Stop reason: HOSPADM

## 2025-07-06 RX ORDER — SIMETHICONE 40MG/0.6ML
133 SUSPENSION, DROPS(FINAL DOSAGE FORM)(ML) ORAL
Status: DISCONTINUED | OUTPATIENT
Start: 2025-07-06 | End: 2025-07-06 | Stop reason: HOSPADM

## 2025-07-06 RX ORDER — LIDOCAINE HYDROCHLORIDE 10 MG/ML
INJECTION, SOLUTION INFILTRATION; PERINEURAL PRN
Status: DISCONTINUED | OUTPATIENT
Start: 2025-07-06 | End: 2025-07-06 | Stop reason: HOSPADM

## 2025-07-06 RX ORDER — TADALAFIL 20 MG/1
20 TABLET ORAL DAILY PRN
COMMUNITY
Start: 2024-11-05

## 2025-07-06 RX ORDER — LIDOCAINE HYDROCHLORIDE 10 MG/ML
3 INJECTION, SOLUTION EPIDURAL; INFILTRATION; INTRACAUDAL; PERINEURAL
Status: DISCONTINUED | OUTPATIENT
Start: 2025-07-06 | End: 2025-07-06 | Stop reason: HOSPADM

## 2025-07-06 RX ORDER — NALOXONE HYDROCHLORIDE 0.4 MG/ML
0.2 INJECTION, SOLUTION INTRAMUSCULAR; INTRAVENOUS; SUBCUTANEOUS
Status: DISCONTINUED | OUTPATIENT
Start: 2025-07-06 | End: 2025-07-06 | Stop reason: HOSPADM

## 2025-07-06 RX ORDER — IBUPROFEN 400 MG/1
400 TABLET, FILM COATED ORAL EVERY 6 HOURS PRN
COMMUNITY

## 2025-07-06 RX ORDER — LIDOCAINE HYDROCHLORIDE 40 MG/ML
INJECTION, SOLUTION RETROBULBAR PRN
Status: DISCONTINUED | OUTPATIENT
Start: 2025-07-06 | End: 2025-07-06 | Stop reason: HOSPADM

## 2025-07-06 RX ORDER — EPINEPHRINE 1 MG/ML
0.1 INJECTION, SOLUTION, CONCENTRATE INTRAVENOUS
Status: DISCONTINUED | OUTPATIENT
Start: 2025-07-06 | End: 2025-07-06 | Stop reason: HOSPADM

## 2025-07-06 RX ORDER — LIDOCAINE HYDROCHLORIDE 40 MG/ML
3 INJECTION, SOLUTION RETROBULBAR
Status: DISCONTINUED | OUTPATIENT
Start: 2025-07-06 | End: 2025-07-06 | Stop reason: HOSPADM

## 2025-07-06 RX ORDER — METHYLPREDNISOLONE SODIUM SUCCINATE 125 MG/2ML
60 INJECTION INTRAMUSCULAR; INTRAVENOUS EVERY 6 HOURS
Status: DISCONTINUED | OUTPATIENT
Start: 2025-07-06 | End: 2025-07-06

## 2025-07-06 RX ORDER — ONDANSETRON 4 MG/1
4 TABLET, ORALLY DISINTEGRATING ORAL EVERY 6 HOURS PRN
Status: DISCONTINUED | OUTPATIENT
Start: 2025-07-06 | End: 2025-07-07 | Stop reason: HOSPADM

## 2025-07-06 RX ORDER — FLUMAZENIL 0.1 MG/ML
0.2 INJECTION, SOLUTION INTRAVENOUS
Status: DISCONTINUED | OUTPATIENT
Start: 2025-07-06 | End: 2025-07-06 | Stop reason: HOSPADM

## 2025-07-06 RX ORDER — METHYLPREDNISOLONE SODIUM SUCCINATE 125 MG/2ML
125 INJECTION INTRAMUSCULAR; INTRAVENOUS EVERY 6 HOURS
Status: DISCONTINUED | OUTPATIENT
Start: 2025-07-06 | End: 2025-07-07 | Stop reason: HOSPADM

## 2025-07-06 RX ORDER — FENTANYL CITRATE 50 UG/ML
25-100 INJECTION, SOLUTION INTRAMUSCULAR; INTRAVENOUS EVERY 5 MIN PRN
Refills: 0 | Status: DISCONTINUED | OUTPATIENT
Start: 2025-07-06 | End: 2025-07-06 | Stop reason: HOSPADM

## 2025-07-06 RX ORDER — ALBUTEROL SULFATE 0.83 MG/ML
2.5 SOLUTION RESPIRATORY (INHALATION) ONCE
Status: DISCONTINUED | OUTPATIENT
Start: 2025-07-06 | End: 2025-07-06 | Stop reason: HOSPADM

## 2025-07-06 RX ORDER — AMOXICILLIN 250 MG
1 CAPSULE ORAL 2 TIMES DAILY PRN
Status: DISCONTINUED | OUTPATIENT
Start: 2025-07-06 | End: 2025-07-07 | Stop reason: HOSPADM

## 2025-07-06 RX ORDER — LEVOTHYROXINE SODIUM 75 UG/1
75 TABLET ORAL DAILY
Status: DISCONTINUED | OUTPATIENT
Start: 2025-07-06 | End: 2025-07-06

## 2025-07-06 RX ORDER — LIDOCAINE HYDROCHLORIDE 20 MG/ML
1-5 SOLUTION OROPHARYNGEAL
Status: DISCONTINUED | OUTPATIENT
Start: 2025-07-06 | End: 2025-07-06 | Stop reason: HOSPADM

## 2025-07-06 RX ORDER — ALBUTEROL SULFATE 0.83 MG/ML
2.5 SOLUTION RESPIRATORY (INHALATION) EVERY 8 HOURS PRN
Status: DISCONTINUED | OUTPATIENT
Start: 2025-07-06 | End: 2025-07-07 | Stop reason: HOSPADM

## 2025-07-06 RX ORDER — AMOXICILLIN 250 MG
2 CAPSULE ORAL 2 TIMES DAILY PRN
Status: DISCONTINUED | OUTPATIENT
Start: 2025-07-06 | End: 2025-07-07 | Stop reason: HOSPADM

## 2025-07-06 RX ORDER — NALOXONE HYDROCHLORIDE 0.4 MG/ML
0.4 INJECTION, SOLUTION INTRAMUSCULAR; INTRAVENOUS; SUBCUTANEOUS
Status: DISCONTINUED | OUTPATIENT
Start: 2025-07-06 | End: 2025-07-06 | Stop reason: HOSPADM

## 2025-07-06 RX ORDER — MAGNESIUM HYDROXIDE 1200 MG/15ML
LIQUID ORAL PRN
Status: DISCONTINUED | OUTPATIENT
Start: 2025-07-06 | End: 2025-07-06 | Stop reason: HOSPADM

## 2025-07-06 RX ORDER — CETIRIZINE HYDROCHLORIDE 10 MG/1
10 TABLET ORAL DAILY
COMMUNITY

## 2025-07-06 RX ORDER — ACETAMINOPHEN 650 MG/1
650 SUPPOSITORY RECTAL EVERY 4 HOURS PRN
Status: DISCONTINUED | OUTPATIENT
Start: 2025-07-06 | End: 2025-07-07 | Stop reason: HOSPADM

## 2025-07-06 RX ORDER — ACETAMINOPHEN 500 MG
500-1000 TABLET ORAL EVERY 6 HOURS PRN
COMMUNITY

## 2025-07-06 RX ORDER — ACETAMINOPHEN 325 MG/1
650 TABLET ORAL EVERY 4 HOURS PRN
Status: DISCONTINUED | OUTPATIENT
Start: 2025-07-06 | End: 2025-07-07 | Stop reason: HOSPADM

## 2025-07-06 RX ORDER — SODIUM CHLORIDE 9 MG/ML
INJECTION, SOLUTION INTRAVENOUS CONTINUOUS
Status: DISCONTINUED | OUTPATIENT
Start: 2025-07-06 | End: 2025-07-06

## 2025-07-06 RX ORDER — LIDOCAINE HYDROCHLORIDE 20 MG/ML
JELLY TOPICAL PRN
Status: DISCONTINUED | OUTPATIENT
Start: 2025-07-06 | End: 2025-07-06 | Stop reason: HOSPADM

## 2025-07-06 RX ORDER — ATROPINE SULFATE 0.1 MG/ML
1 INJECTION INTRAVENOUS
Status: DISCONTINUED | OUTPATIENT
Start: 2025-07-06 | End: 2025-07-06 | Stop reason: HOSPADM

## 2025-07-06 RX ORDER — DIPHENHYDRAMINE HYDROCHLORIDE 50 MG/ML
25-50 INJECTION, SOLUTION INTRAMUSCULAR; INTRAVENOUS
Status: DISCONTINUED | OUTPATIENT
Start: 2025-07-06 | End: 2025-07-06 | Stop reason: HOSPADM

## 2025-07-06 RX ORDER — FLUTICASONE PROPIONATE 50 MCG
1 SPRAY, SUSPENSION (ML) NASAL DAILY
COMMUNITY
Start: 2025-05-14

## 2025-07-06 RX ORDER — ONDANSETRON 2 MG/ML
4 INJECTION INTRAMUSCULAR; INTRAVENOUS EVERY 6 HOURS PRN
Status: DISCONTINUED | OUTPATIENT
Start: 2025-07-06 | End: 2025-07-07 | Stop reason: HOSPADM

## 2025-07-06 RX ORDER — PROCHLORPERAZINE MALEATE 5 MG/1
5 TABLET ORAL EVERY 6 HOURS PRN
Status: DISCONTINUED | OUTPATIENT
Start: 2025-07-06 | End: 2025-07-07 | Stop reason: HOSPADM

## 2025-07-06 RX ADMIN — SODIUM CHLORIDE: 0.9 INJECTION, SOLUTION INTRAVENOUS at 06:41

## 2025-07-06 RX ADMIN — MIDAZOLAM 1 MG: 1 INJECTION INTRAMUSCULAR; INTRAVENOUS at 09:39

## 2025-07-06 RX ADMIN — FENTANYL CITRATE 25 MCG: 50 INJECTION, SOLUTION INTRAMUSCULAR; INTRAVENOUS at 09:44

## 2025-07-06 RX ADMIN — TRANEXAMIC ACID 500 MG: 1 INJECTION, SOLUTION INTRAVENOUS at 13:16

## 2025-07-06 RX ADMIN — METHYLPREDNISOLONE SODIUM SUCCINATE 125 MG: 125 INJECTION, POWDER, FOR SOLUTION INTRAMUSCULAR; INTRAVENOUS at 12:41

## 2025-07-06 RX ADMIN — MIDAZOLAM 1 MG: 1 INJECTION INTRAMUSCULAR; INTRAVENOUS at 09:44

## 2025-07-06 RX ADMIN — SODIUM CHLORIDE: 0.9 INJECTION, SOLUTION INTRAVENOUS at 01:05

## 2025-07-06 RX ADMIN — METHYLPREDNISOLONE SODIUM SUCCINATE 125 MG: 125 INJECTION, POWDER, FOR SOLUTION INTRAMUSCULAR; INTRAVENOUS at 17:58

## 2025-07-06 RX ADMIN — ACETAMINOPHEN 650 MG: 325 TABLET ORAL at 18:04

## 2025-07-06 RX ADMIN — FENTANYL CITRATE 50 MCG: 50 INJECTION, SOLUTION INTRAMUSCULAR; INTRAVENOUS at 09:39

## 2025-07-06 RX ADMIN — TRANEXAMIC ACID 500 MG: 1 INJECTION, SOLUTION INTRAVENOUS at 21:57

## 2025-07-06 RX ADMIN — TOPICAL ANESTHETIC 0.5 ML: 200 SPRAY DENTAL; PERIODONTAL at 09:41

## 2025-07-06 ASSESSMENT — ACTIVITIES OF DAILY LIVING (ADL)
ADLS_ACUITY_SCORE: 23
ADLS_ACUITY_SCORE: 46
ADLS_ACUITY_SCORE: 23
ADLS_ACUITY_SCORE: 41
ADLS_ACUITY_SCORE: 23

## 2025-07-06 NOTE — PROGRESS NOTES
We will be doing a bronchoscopy today in an hour. Endo nurse to call up and set up transport down stairs.

## 2025-07-06 NOTE — CONSULTS
PULMONARY CONSULT  Date of service: 2025    Patient: Denilson Santana      : 1945      MRN: [id number]           Impressions/Recommendations:   #Hemoptysis:   # Left lung upper lobe ground glass and possible fibrosis  -  Bronchoscopy with lavage was performed.  -Bloody secretions noted in the elft upper lobe.  -Sequential Bal showed increasing bloody return.  -Concern for HP due to Bird and animal exposure at [employer]. Every week 4/6 hours.  -Ordered extensive ILD panel.  -Started steroids.  -Also ordered dxa nebs.   - Await bronch labs.     -Pulmonary will continue to follow.         Wyatt Zelaya MD  Pulmonary & Critical Care            History of Present Illness:   Denilson Santana is a 79 year old male who presented to [facility] on 2025 with complaints of Hemoptysis and abnormal chest.     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   - Hasn't smoked since .  - Lives in a town house. Has carpet which was cleaned last year.  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               Review of Symptoms:   10-point ROS reviewed, & found negative w/ exceptions noted in the HPI.          Past Medical History:     Past Medical History:   Diagnosis Date    Actinic keratosis     Allergic state     Bronchitis     CPAP (continuous positive airway pressure) dependence     Heart murmur     Hypercholesterolemia     Hyperlipidemia     Lumbago     Sleep apnea     Thyroid disease        Past Surgical History:   Procedure Laterality Date    ANKLE SURGERY      CHOLECYSTECTOMY      ENT SURGERY      tonsillectomy,adenoidectomy    EXCISE LESION NECK  6/3/2014    Procedure: EXCISE LESION NECK;  Surgeon: Miki Wesley MD;  Location: [facility]    HERNIORRHAPHY INGUINAL  6/3/2014    Procedure: HERNIORRHAPHY INGUINAL;  Surgeon: Miki Wesley MD;  Location: [facility]            Allergies:      Allergies   Allergen Reactions    Amoxicillin-Pot Clavulanate     Erythromycin Diarrhea and GI Disturbance    Tobacco     Trees      Red Maple    Weed [Grass]      Grass and Weeds              Outpatient Medications:     Current Facility-Administered Medications   Medication Dose Route Frequency Provider Last Rate Last Admin    acetaminophen (TYLENOL) tablet 650 mg  650 mg Oral Q4H PRN Mesfin, Rikki A, DO        Or    acetaminophen (TYLENOL) Suppository 650 mg  650 mg Rectal Q4H PRN Mesfin, Rikki A, DO        albuterol (PROVENTIL) neb solution 2.5 mg  2.5 mg Nebulization Q8H PRN Wyatt Zelaya MD        levothyroxine (SYNTHROID/LEVOTHROID) tablet 75 mcg  75 mcg Oral Daily Mesfin, Rikki A, DO        methylPREDNISolone Na Suc (solu-MEDROL) injection 62.5 mg  62.5 mg Intravenous Q6H Wyatt Zelaya MD        ondansetron (ZOFRAN ODT) ODT tab 4 mg  4 mg Oral Q6H PRN Mesfin, Rikki A, DO        Or    ondansetron (ZOFRAN) injection 4 mg  4 mg Intravenous Q6H PRN Mesfin, Rikki A, DO        prochlorperazine (COMPAZINE) injection 5 mg  5 mg Intravenous Q6H PRN Mesfin, Rikki A, DO        Or    prochlorperazine (COMPAZINE) tablet 5 mg  5 mg Oral Q6H PRN Mesfin, Rikki A, DO        senna-docusate (SENOKOT-S/PERICOLACE) 8.6-50 MG per tablet 1 tablet  1 tablet Oral BID PRN Mesfin, Rikki A, DO        Or    senna-docusate (SENOKOT-S/PERICOLACE) 8.6-50 MG per tablet 2 tablet  2 tablet Oral BID PRN Emsfin, Rikki A, DO        sodium chloride 0.9 % infusion   Intravenous Continuous Mesfin, Rikki A,  mL/hr at 07/06/25 0641 New Bag at 07/06/25 0641    tranexamic acid (CYKLOKAPRON) 100 mg/mL inhalation solution 500 mg  500 mg Nebulization Q8H Wyatt Zelaya MD                 Family History:   History reviewed. No pertinent family history.            Social History:     Social History     Tobacco Use    Smoking status: Former     Types: Cigarettes    Smokeless tobacco: Never    Tobacco comments:     quit 29+ yrs ago (03/26/11)   Substance  Use Topics    Alcohol use: No    Drug use: No             Physical Exam:   /75   Pulse 64   Temp 98.5  F (36.9  C) (Temporal)   Resp 12   Ht 1.829 m (6')   Wt 108.5 kg (239 lb 1.6 oz)   SpO2 92%   BMI 32.43 kg/m      General: NAD  HEENT: Anicteric sclera, EOMI, MMM, OP unobstructed, w/o erythema/discharge; no cervical LAD  CV: RRR, no m/r/g  Lungs: Coarse  Abd: Soft, NT, ND  Ext: WWP,  No LE edema  Skin: No rashes, cyanosis, or jaundice  Neuro: AAOx3, no focal deficits

## 2025-07-06 NOTE — PLAN OF CARE
"Provided cares [phone]    Goal Outcome Evaluation:      Plan of Care Reviewed With: patient    Overall Patient Progress: no changeOverall Patient Progress: no change    Outcome Evaluation: a&ox4, IND in room, tolerating reg diet, bronch completed this am- awaiting lavage results, POC ongoing    Problem: Adult Inpatient Plan of Care  Goal: Plan of Care Review  Description: The Plan of Care Review/Shift note should be completed every shift.  The Outcome Evaluation is a brief statement about your assessment that the patient is improving, declining, or no change.  This information will be displayed automatically on your shift  note.  7/6/2025 1449 by Virginia Simpson, RN  Outcome: Progressing  Flowsheets (Taken 7/6/2025 1449)  Outcome Evaluation: a&ox4, IND in room, tolerating reg diet, bronch completed this am- awaiting lavage results, POC ongoing  Plan of Care Reviewed With: patient  Overall Patient Progress: no change  7/6/2025 1009 by Virginia Simpson, RN  Outcome: Progressing  Flowsheets (Taken 7/6/2025 0800)  Outcome Evaluation: pt reports sputum normal color overnight, plan for bronchoscopy this am  Plan of Care Reviewed With: patient  Overall Patient Progress: improving  Goal: Patient-Specific Goal (Individualized)  Description: You can add care plan individualizations to a care plan. Examples of Individualization might be:  \"Parent requests to be called daily at 9am for status\", \"I have a hard time hearing out of my right ear\", or \"Do not touch me to wake me up as it startles  me\".  7/6/2025 1449 by Virginia Simpson, RN  Outcome: Progressing  7/6/2025 1009 by Virginia Simpson, RN  Outcome: Progressing  Goal: Absence of Hospital-Acquired Illness or Injury  7/6/2025 1449 by Virginia Simpson, RN  Outcome: Progressing  7/6/2025 1009 by Virginia Simpson, RN  Outcome: Progressing  Intervention: Identify and Manage Fall Risk  Recent Flowsheet Documentation  Taken 7/6/2025 0752 by Virginia Simpson, RN  Safety Promotion/Fall Prevention:   " clutter free environment maintained   increase visualization of patient   nonskid shoes/slippers when out of bed   safety round/check completed  Goal: Optimal Comfort and Wellbeing  7/6/2025 1449 by Virginia Simpson, RN  Outcome: Progressing  7/6/2025 1009 by Virginia Simpson, RN  Outcome: Progressing  Goal: Readiness for Transition of Care  7/6/2025 1449 by Virginia Simpson, RN  Outcome: Progressing  7/6/2025 1009 by Virginia Simpson, RN  Outcome: Progressing

## 2025-07-06 NOTE — OR NURSING
Pt with bronchoscopy on endo unit, tolerated well. Recovery per protocol and then transfer back to inpatient unit. POC discussed with pt prior to transfer

## 2025-07-06 NOTE — PROGRESS NOTES
Auto CPAP on RA was applied to the pt via the mask for NOC use. The skin in contact with the mask and straps looks good and intact. Pt is tolerating it well. RT will continue to monitor and assess the pt's respiratory status and needs.  RT/RN huddle to discuss contraindications prior to placement? Y/N? Yes    Kvng Pro, RT on 7/6/2025 at 2:59 AM  .     Detail Level: Simple Add 31399 Cpt? (Important Note: In 2017 The Use Of 63267 Is Being Tracked By Cms To Determine Future Global Period Reimbursement For Global Periods): yes

## 2025-07-06 NOTE — PLAN OF CARE
"Inpatient Shift Note: [id number]    A/Ox4, on room air with CPAP use at night, up independently, regular diet, denies pain. Bronch completed today- awaiting results. Receiving Tranexamic acid nebs with RT. Patient denies bloody sputum. On IV solumedrol q6h. PIV saline locked. Around 6pm, patient reported feeling \"feverish.\" Temperature taken- 99.0. Per patient request, PRN tylenol given.     BP (!) 163/88 (BP Location: Right arm)   Pulse 89   Temp 99  F (37.2  C)   Resp 16   Ht 1.829 m (6')   Wt 108.5 kg (239 lb 1.6 oz)   SpO2 94%   BMI 32.43 kg/m        Goal Outcome Evaluation:      Plan of Care Reviewed With: patient    Overall Patient Progress: improvingOverall Patient Progress: improving    Outcome Evaluation: Assumed care from [id number]. Bronch completed today- awaiting results.        Problem: Adult Inpatient Plan of Care  Goal: Plan of Care Review  Description: The Plan of Care Review/Shift note should be completed every shift.  The Outcome Evaluation is a brief statement about your assessment that the patient is improving, declining, or no change.  This information will be displayed automatically on your shift  note.  Outcome: Progressing  Flowsheets (Taken 7/6/2025 1640)  Outcome Evaluation: Assumed care from 7814-4294. Bronch completed today- awaiting results.  Plan of Care Reviewed With: patient  Overall Patient Progress: improving  Goal: Patient-Specific Goal (Individualized)  Description: You can add care plan individualizations to a care plan. Examples of Individualization might be:  \"Parent requests to be called daily at 9am for status\", \"I have a hard time hearing out of my right ear\", or \"Do not touch me to wake me up as it startles  me\".  Outcome: Progressing  Goal: Absence of Hospital-Acquired Illness or Injury  Outcome: Progressing  Goal: Optimal Comfort and Wellbeing  Outcome: Progressing  Goal: Readiness for Transition of Care  Outcome: Progressing     Problem: Delirium  Goal: Optimal " Coping  Outcome: Progressing  Goal: Improved Behavioral Control  Outcome: Progressing  Goal: Improved Attention and Thought Clarity  Outcome: Progressing  Goal: Improved Sleep  Outcome: Progressing

## 2025-07-06 NOTE — PLAN OF CARE
"Goal Outcome Evaluation:  PRIMARY DIAGNOSIS: HEMOPTYSIS  OUTPATIENT/OBSERVATION GOALS TO BE MET BEFORE DISCHARGE:  ADLs back to baseline: No    Activity and level of assistance: Ambulating independently.    Pain status: Pain free.    Return to near baseline physical activity: No     Discharge Planner Nurse   Safe discharge environment identified: Yes  Barriers to discharge: Yes       Entered by: Corine aMier RN 07/06/2025 5:15 AM  BP (!) 175/86 (BP Location: Right arm)   Pulse 77   Temp 98.5  F (36.9  C) (Oral)   Resp 16   Ht 1.829 m (6')   Wt 108.5 kg (239 lb 1.6 oz)   SpO2 93%   BMI 32.43 kg/m      Alert & oriented x 4. VSS. Denies pain. Independent in room.Verbalizes no hemoptysis since coming up to unit Pt is NPO, IV fluids infusing.Used CPAP overnight. Bronchoscopy  in AM. Pulmonology consulted.  Please review provider order for any additional goals.   Nurse to notify provider when observation goals have been met and patient is ready for discharge.      Plan of Care Reviewed With: patient    Problem: Adult Inpatient Plan of Care  Goal: Plan of Care Review  Description: The Plan of Care Review/Shift note should be completed every shift.  The Outcome Evaluation is a brief statement about your assessment that the patient is improving, declining, or no change.  This information will be displayed automatically on your shift  note.  Outcome: Progressing  Flowsheets (Taken 7/6/2025 0514)  Plan of Care Reviewed With: patient  Overall Patient Progress: improving  Goal: Patient-Specific Goal (Individualized)  Description: You can add care plan individualizations to a care plan. Examples of Individualization might be:  \"Parent requests to be called daily at 9am for status\", \"I have a hard time hearing out of my right ear\", or \"Do not touch me to wake me up as it startles  me\".  Outcome: Progressing  Goal: Absence of Hospital-Acquired Illness or Injury  Outcome: Progressing  Intervention: Identify and Manage Fall " Risk  Recent Flowsheet Documentation  Taken 7/6/2025 0211 by Corine Maier RN  Safety Promotion/Fall Prevention:   clutter free environment maintained   increase visualization of patient  Intervention: Prevent Skin Injury  Recent Flowsheet Documentation  Taken 7/6/2025 0211 by Corine Maier RN  Body Position: position changed independently  Goal: Optimal Comfort and Wellbeing  Outcome: Progressing  Goal: Readiness for Transition of Care  Outcome: Progressing  Flowsheets  Taken 7/6/2025 0514  Transportation Anticipated: family or friend will provide  Concerns to be Addressed: discharge planning  Taken 7/6/2025 0211  Transportation Anticipated: family or friend will provide  Intervention: Mutually Develop Transition Plan  Recent Flowsheet Documentation  Taken 7/6/2025 0514 by Corine Maier RN  Transportation Anticipated: family or friend will provide  Concerns to be Addressed: discharge planning  Taken 7/6/2025 0211 by Corine Maier RN  Transportation Anticipated: family or friend will provide  Patient/Family Anticipates Transition to: home with family  Equipment Currently Used at Home: none     Problem: Delirium  Goal: Optimal Coping  Outcome: Progressing  Goal: Improved Behavioral Control  Outcome: Progressing  Intervention: Minimize Safety Risk  Recent Flowsheet Documentation  Taken 7/6/2025 0211 by Corine Maier RN  Enhanced Safety Measures: review medications for side effects with activity  Goal: Improved Attention and Thought Clarity  Outcome: Progressing  Goal: Improved Sleep  Outcome: Progressing       Overall Patient Progress: improvingOverall Patient Progress: improving

## 2025-07-06 NOTE — H&P
[facility]    History and Physical - Hospitalist Service       Date of Admission:  7/5/2025    Assessment & Plan     Denilson Santana is a 79 year old male w/PMH hypothyroidism, DLD, SHANIQUE, recent pneumonia and sinus infections who presents with hemoptysis and found to have abnormal chest CT    Hemoptysis  Ground glass opacites LEVI on CT  Recent sinusitis and pnuemonia  -reports being treated with antibiotics twice over past 6 weeks for sinusitis as well as nasal sprays, ibuprofen, etc. Also treated for pneumonia in recent past as well. Today had small amount of bright red hemoptysis after coughing, decreasing amounts each time. Not on blood thinners other than recnet NSAID use.   -in ED Hgb was stable but CT had above findings. 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 SHANIQUE, remote smoking history  -reports only taking medication for thryoid at home so synthroid resumed  -not sure if he uses CPAP regularly         Diet:  NPO  DVT Prophylaxis: Pneumatic Compression Devices  Castillo Catheter: Not present  Lines: None     Cardiac Monitoring: None  Code Status:  full code      Disposition Plan     Medically Ready for Discharge: Anticipated Tomorrow           Rikki Toure DO  Hospitalist Service  [facility]  Securely message with [employer] (more info)  Text page via [employer] Paging/Directory      ______________________________________________________________________    Chief Complaint   hemotysis      History of Present Illness   Denilson Santana is a 79 year old male w/PMH hypothyroidism, DLD, SHANIQUE, recent pneumonia and sinus infections who presents with hemoptysis. He reports has been dealing with what he thought were sinus infections over past 6 weeks and was treated with antibiotics initially and improved but symptoms recurred. Was treated again with antibiotics in the past few weeks and was doing nasal sprays, zyrtec and antibiotics again. Has had persistent post nasal drip and some sob but no fever, chills, NV or other complaints.    However today had 3 episodes of hemoptysis, bright red without clots and decreasing amounts each time. Came to ED and Hgb was normal but CT showed ground glass in LEVI as well as some findings suggesting mild chronic fibrosis. 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 ED talked with pulmonology who recommended admission with plan for bronchoscopy in am.      Past Medical History    Past Medical History:   Diagnosis Date    Actinic keratosis     Allergic state     Bronchitis     CPAP (continuous positive airway pressure) dependence     Heart murmur     Hypercholesterolemia     Hyperlipidemia     Lumbago     Sleep apnea     Thyroid disease        Past Surgical History   Past Surgical History:   Procedure Laterality Date    ANKLE SURGERY      CHOLECYSTECTOMY      ENT SURGERY      tonsillectomy,adenoidectomy    EXCISE LESION NECK  6/3/2014    Procedure: EXCISE LESION NECK;  Surgeon: Miki Wesley MD;  Location: [facility]    HERNIORRHAPHY INGUINAL  6/3/2014    Procedure: HERNIORRHAPHY INGUINAL;  Surgeon: Miki Wesley MD;  Location: [facility]       Prior to Admission Medications   Prior to Admission Medications   Prescriptions Last Dose Informant Patient Reported? Taking?    Acetaminophen (TYLENOL PO)   Yes No   Coenzyme Q10 (COQ-10) 200 MG CAPS   Yes No   Sig: Take by mouth daily   LEVOTHYROXINE SODIUM PO   Yes No   Sig: Take 75 mcg by mouth daily    Omega-3 Fatty Acids (OMEGA-3 FISH OIL PO)   Yes No   Sildenafil Citrate (VIAGRA PO)   Yes No   Sig: Take 25 mg by mouth daily as needed   VITAMIN D, CHOLECALCIFEROL, PO   Yes No   Sig: Take 1,000 Units by mouth daily   VITAMIN E COMPLEX PO   Yes No   albuterol 108 (90 BASE) MCG/ACT inhaler   No No   Sig: Inhale 2 puffs into the lungs every 6 hours.   cetirizine (ZYRTEC) 10 MG tablet   Yes No   Sig: Take 10 mg by mouth daily   cyanocolbalamin (VITAMIN  B-12) 100 MCG tablet   Yes No   Sig: Take 50 mcg by mouth daily   flunisolide (NASALIDE) 25 MCG/ACT (0.025%) SOLN   Yes No   Sig: Spray 2 sprays into both nostrils daily as needed   guaiFENesin (MUCINEX) 600 MG 12 hr tablet   Yes No   Sig: Take 1,200 mg by mouth 2 times daily as needed.   mometasone (NASONEX) 50 MCG/ACT nasal spray   Yes No   Si sprays by Both Nostrils route daily.   multivitamin, therapeutic with minerals (MULTI-VITAMIN) TABS   Yes No   Sig: Take 1 tablet by mouth daily      Facility-Administered Medications: None        Review of Systems    The 10 point Review of Systems is negative other than noted in the HPI or here.     Social History   I have reviewed this patient's social history and updated it with pertinent information if needed.  Social History     Tobacco Use    Smoking status: Former     Types: Cigarettes    Smokeless tobacco: Never    Tobacco comments:     quit 29+ yrs ago (11)   Substance Use Topics    Alcohol use: No    Drug use: No         Family History   Reports pulmonary fibrosis in family      Allergies   Allergies   Allergen Reactions    Amoxicillin-Pot Clavulanate     Erythromycin Diarrhea and GI Disturbance    Tobacco     Trees      Red Maple    Weed [Grass]      Grass and Weeds         Physical Exam   Vital Signs: Temp: 97  F (36.1  C) Temp  src: Temporal BP: 128/81 Pulse: 93   Resp: 20 SpO2: 100 % O2 Device: None (Room air)    Weight: 240 lbs 15.4 oz    Constitutional: awake, alert, and cooperative, emotional  Respiratory: no increased work of breathing, good air exchange, and clear to auscultation  Cardiovascular: regular rate and rhythm and no murmur noted  GI: normal bowel sounds, soft, and non-distended  Skin: no bruising or bleeding  Neurologic: alert, interactive, no focal deficits    Medical Decision Making       65 MINUTES SPENT BY ME on the date of service doing chart review, history, exam, documentation & further activities per the note.      Data   ------------------------- PAST 24 HR DATA REVIEWED -----------------------------------------------    I have personally reviewed the following data over the past 24 hrs:    11.4 (H)  \   14.7   / 220     142 105 21.7 /  106 (H)   4.9 26 1.11 \     INR:  1.05 PTT:  N/A   D-dimer:  0.69 (H) Fibrinogen:  N/A       Imaging results reviewed over the past 24 hrs:   Recent Results (from the past 24 hours)   XR Chest 2 Views    Narrative    EXAM: XR CHEST 2 VIEWS  LOCATION: [facility]  DATE: 7/5/2025    INDICATION: cough blood tinged  COMPARISON: 9/21/2024      Impression    IMPRESSION: New hazy airspace opacities throughout left lung suspicious for pneumonia in the setting of cough. Clear right lung. No pleural effusion. Normal heart size and pulmonary vascularity. Aortic calcifications.   CT Chest Pulmonary Embolism w Contrast    Narrative    EXAM: CT CHEST PULMONARY EMBOLISM W CONTRAST  LOCATION: [facility]  DATE: 7/5/2025    INDICATION: hemoptysis, elevated ddimer  COMPARISON: Cardiac CT 10/1/2019  TECHNIQUE: CT chest pulmonary angiogram during arterial phase injection of IV contrast. Multiplanar reformats and MIP reconstructions were performed. Dose reduction techniques were used.   CONTRAST: 85mL Isovue 370    FINDINGS:  ANGIOGRAM CHEST: Pulmonary  arteries are normal caliber and negative for pulmonary emboli. Thoracic aorta is negative for dissection. No CT evidence of right heart strain.    LUNGS AND PLEURA: There is new extensive somewhat geographic groundglass opacity involving much of left upper lobe as well as mild groundglass opacities involving lower lobes posteriorly. The findings are nonspecific and could certainly relate to the   patient's reported hemoptysis though atypical pneumonia or acute alveolitis related to underlying chronic interstitial lung disease also possible. There is a mild reticular peripheral interstitial pattern suggesting potential underlying pulmonary   fibrosis. No pleural effusion.    MEDIASTINUM/AXILLAE: A few minimally prominent lymph nodes present likely reactive.    CORONARY ARTERY CALCIFICATION: None.    UPPER ABDOMEN: Cholecystectomy. Small hepatic cyst.    MUSCULOSKELETAL: Normal.      Impression    IMPRESSION:  1.  Groundglass opacities predominantly involving left upper lobe are nonspecific but could relate to alveolar hemorrhage in a patient with hemoptysis. Atypical pneumonia or even acute cellulitis relating to chronic interstitial lung disease are other   considerations.  2.  There does appear to be underlying reticular interstitial prominence suggesting mild chronic fibrosis.

## 2025-07-06 NOTE — PLAN OF CARE
"Goal Outcome Evaluation:      Plan of Care Reviewed With: patient    Overall Patient Progress: improvingOverall Patient Progress: improving    Outcome Evaluation: pt reports sputum normal color overnight, plan for bronchoscopy this am    PRIMARY DIAGNOSIS: \"GENERIC\" NURSING  OUTPATIENT/OBSERVATION GOALS TO BE MET BEFORE DISCHARGE:  ADLs back to baseline: Yes    Activity and level of assistance: Ambulating independently.    Pain status: Pain free.    Return to near baseline physical activity: Yes     Discharge Planner Nurse   Safe discharge environment identified: Yes  Barriers to discharge: Yes       Entered by: Virginia Simpson RN 07/06/2025    A&ox4, IND in room, planning to bronchoscopy later this am  Please review provider order for any additional goals.   Nurse to notify provider when observation goals have been met and patient is ready for discharge.  "

## 2025-07-06 NOTE — PLAN OF CARE
"Goal Outcome Evaluation:  PRIMARY DIAGNOSIS: HEMOPTYSIS  OUTPATIENT/OBSERVATION GOALS TO BE MET BEFORE DISCHARGE:  ADLs back to baseline: No    Activity and level of assistance: Ambulating independently.    Pain status: Pain free.    Return to near baseline physical activity: No     Discharge Planner Nurse   Safe discharge environment identified: Yes  Barriers to discharge: Yes       Entered by: Corine Maier RN 07/06/2025 5:15 AM  BP (!) 175/86 (BP Location: Right arm)   Pulse 77   Temp 98.5  F (36.9  C) (Oral)   Resp 16   Ht 1.829 m (6')   Wt 108.5 kg (239 lb 1.6 oz)   SpO2 93%   BMI 32.43 kg/m      Alert & oriented x 4. VSS. Denies pain. Independent in room.Verbalizes scant  amounts of hemoptysis. Pt is NPO, IV fluids infusing.Used CPAP overnight. Bronchoscopy  in AM. Pulmonology consulted.  Please review provider order for any additional goals.   Nurse to notify provider when observation goals have been met and patient is ready for discharge.      Plan of Care Reviewed With: patient    Problem: Adult Inpatient Plan of Care  Goal: Plan of Care Review  Description: The Plan of Care Review/Shift note should be completed every shift.  The Outcome Evaluation is a brief statement about your assessment that the patient is improving, declining, or no change.  This information will be displayed automatically on your shift  note.  Outcome: Progressing  Flowsheets (Taken 7/6/2025 0514)  Plan of Care Reviewed With: patient  Overall Patient Progress: improving  Goal: Patient-Specific Goal (Individualized)  Description: You can add care plan individualizations to a care plan. Examples of Individualization might be:  \"Parent requests to be called daily at 9am for status\", \"I have a hard time hearing out of my right ear\", or \"Do not touch me to wake me up as it startles  me\".  Outcome: Progressing  Goal: Absence of Hospital-Acquired Illness or Injury  Outcome: Progressing  Intervention: Identify and Manage Fall " Risk  Recent Flowsheet Documentation  Taken 7/6/2025 0211 by Corine Maier RN  Safety Promotion/Fall Prevention:   clutter free environment maintained   increase visualization of patient  Intervention: Prevent Skin Injury  Recent Flowsheet Documentation  Taken 7/6/2025 0211 by Corine Maier RN  Body Position: position changed independently  Goal: Optimal Comfort and Wellbeing  Outcome: Progressing  Goal: Readiness for Transition of Care  Outcome: Progressing  Flowsheets  Taken 7/6/2025 0514  Transportation Anticipated: family or friend will provide  Concerns to be Addressed: discharge planning  Taken 7/6/2025 0211  Transportation Anticipated: family or friend will provide  Intervention: Mutually Develop Transition Plan  Recent Flowsheet Documentation  Taken 7/6/2025 0514 by Corine Maier RN  Transportation Anticipated: family or friend will provide  Concerns to be Addressed: discharge planning  Taken 7/6/2025 0211 by Corine Maier RN  Transportation Anticipated: family or friend will provide  Patient/Family Anticipates Transition to: home with family  Equipment Currently Used at Home: none     Problem: Delirium  Goal: Optimal Coping  Outcome: Progressing  Goal: Improved Behavioral Control  Outcome: Progressing  Intervention: Minimize Safety Risk  Recent Flowsheet Documentation  Taken 7/6/2025 0211 by Corine Maier RN  Enhanced Safety Measures: review medications for side effects with activity  Goal: Improved Attention and Thought Clarity  Outcome: Progressing  Goal: Improved Sleep  Outcome: Progressing       Overall Patient Progress: improvingOverall Patient Progress: improving

## 2025-07-06 NOTE — ED TRIAGE NOTES
Pt reports he was in the shower and coughed and coughed up blood 3 different times. Pt denies blood thinners. Pt reports he has been having problems with his sinus. Pt recently to cialis

## 2025-07-06 NOTE — ED PROVIDER NOTES
Emergency Department Note      History of Present Illness     Chief Complaint   Hemoptysis      HPI   Denilson Santana is a 79 year old male with a history of hyperlipidemia, hypertension, and hypothyroidism presenting for evaluation of hemoptysis. The patient has been having sinus trouble for the 5-6 weeks and has been seen twice at the urgency room. The patient has also been to the dentist since his sinus pain sometimes extends to his jaw, and his teeth looked fine. For the last two weeks, he has been taking Flonase daily. Zyrtec non-drowsy does not help his symptoms and normal Zyrtec puts him to sleep. He also uses a saline spray daily and rinses his mouth with glycerin. Despite these treatments, he still has a nasal drip and sinus congestion. This afternoon, when in the shower, he coughed up blood for the very first time. He coughed up a little blood while in the emergency department which was red. He denies coughing up mucus. He states that he has had pneumonia 5 times in the past, and this does not feel like that, his lungs feel clear. The patient's wife reports that the patient has been experiencing shortness of breath when walking outside for the last month. The patient reports that he is only short of breath when outside, and he wonders if it is related to the heat and humidity. He has been able to workout indoors for an hour without any trouble breathing or chest pain. The patient takes daily levothyroxine. He has traveled to [place] and [place] in the last year. There is a family history of lung cancer in his mother. The patient denies recent illness, fever, chest pain, leg pain or edema, pain with deep breathing, travel in the last month, history of diabetes, history of tuberculosis, exposure to tuberculosis, and history of blood clots in his legs or lungs.    Independent Historian   Wife as detailed above.    Review of External Notes   The patient was seen on 6/21/25 at the . Sinus CT did not  show any specific findings. Afrin and Flonase were recommended. Prior, he had sinus pain treated with doxycycline.    Past Medical History     Medical History and Problem List   Colonic polyp  Erectile dysfunction  Hyperlipidemia  Hypertension  Hypothyroidism  Sleep apnea  Squamous cell carcinoma  Lumbago     Medications   Levothyroxine  Tadalafil    Surgical History   Tonsillectomy and adenoidectomy  Cholecystectomy  Hernia repair  Ankle fracture, right  Cyst removal, back of neck    Physical Exam     Patient Vitals for the past 24 hrs:   BP Temp Temp src Pulse Resp SpO2 Height Weight   07/06/25 0103 (!) 155/75 98.2  F (36.8  C) Oral 79 -- 98 % -- --   07/05/25 1955 128/81 -- -- -- -- -- -- --   07/05/25 1954 -- 97  F (36.1  C) Temporal 93 20 100 % 1.829 m (6') 109.3 kg (240 lb 15.4 oz)     Physical Exam    Gen: alert  CV: RRR,  Pulm: breath sounds equal, lungs clear  Abd: Soft, nontender  MSK: no deformity, moves all extremities  Neuro: alert, appropriate conversation and interaction      Diagnostics     Lab Results   Labs Ordered and Resulted from Time of ED Arrival to Time of ED Departure   BASIC METABOLIC PANEL - Abnormal       Result Value    Sodium 142      Potassium 4.9      Chloride 105      Carbon Dioxide (CO2) 26      Anion Gap 11      Urea Nitrogen 21.7      Creatinine 1.11      GFR Estimate 68      Calcium 9.2      Glucose 106 (*)    D DIMER QUANTITATIVE - Abnormal    D-Dimer Quantitative 0.69 (*)    CBC WITH PLATELETS AND DIFFERENTIAL - Abnormal    WBC Count 11.4 (*)     RBC Count 5.19      Hemoglobin 14.7      Hematocrit 45.5      MCV 88      MCH 28.3      MCHC 32.3      RDW 15.2 (*)     Platelet Count 220      % Neutrophils 82      % Lymphocytes 9      % Monocytes 7      % Eosinophils 2      % Basophils 0      % Immature Granulocytes 0      NRBCs per 100 WBC 0      Absolute Neutrophils 9.3 (*)     Absolute Lymphocytes 1.1      Absolute Monocytes 0.7      Absolute Eosinophils 0.2      Absolute  Basophils 0.0      Absolute Immature Granulocytes 0.1      Absolute NRBCs 0.0     INR - Normal    INR 1.05      PT 13.8     PROCALCITONIN - Normal    Procalcitonin 0.03     ABO/RH TYPE AND SCREEN       Imaging   CT Chest Pulmonary Embolism w Contrast   Final Result   IMPRESSION:   1.  Groundglass opacities predominantly involving left upper lobe are nonspecific but could relate to alveolar hemorrhage in a patient with hemoptysis. Atypical pneumonia or even acute cellulitis relating to chronic interstitial lung disease are other    considerations.   2.  There does appear to be underlying reticular interstitial prominence suggesting mild chronic fibrosis.      XR Chest 2 Views   Final Result   IMPRESSION: New hazy airspace opacities throughout left lung suspicious for pneumonia in the setting of cough. Clear right lung. No pleural effusion. Normal heart size and pulmonary vascularity. Aortic calcifications.          EKG   ECG taken at , ECG read at   Normal sinus rhythm  Left anterior fascicular block  Minimal voltage criteria for LVH, may be normal variant ([place] product)   Rate 79 bpm. AR interval 172 ms. QRS duration 112 ms. QT/QTc 384/440 ms. P-R-T axes 45 -56 33.    Independent Interpretation   CXR: No infiltrate or pleural effusion.    ED Course      Medications Administered       Procedures   Procedures     Discussion of Management   Admitting Hospitalist, Dr. Toure  Pulmonology, Dr. Lee    ED Course   ED Course as of 25 0113   Sat  I obtained history and performed physical exam.    XR Chest 2 Views    I consulted with Dr. Lee from pulmonology    I updated the patient on plan of care    I rechecked on the patient to answer a question. His father  of pulmonary fibrosis.    I spoke with the hospitalist, Dr. Toure, regarding admission. They accepted.       Additional Documentation  None    Medical Decision Making / Diagnosis     CMS Diagnoses:  None    MIPS   CT for PE was ordered because the patient had an abnormal d-dimer.               [facility]   Denilson Santana is a 79 year old male presents for evaluation of small-volume hemoptysis.  No overt infectious symptoms.  Patient overall well-appearing here.   considered PE.  Elevated D-dimer was noted.  Patient underwent CT chest pulmonary angiogram for further evaluation of hemoptysis.  No signs of PE.  Patient with patchy groundglass infiltrates.  Discussed with on-call pulmonologist who recommended admission due to active hemoptysis and need for more extensive evaluation.  Will consider bronchoscopy in the morning and request patient be made n.p.o. after midnight.  Patient discussed with Dr. Toure, admitting hospitalist.    Patient not currently anticoagulated.  Hemoglobin within normal limits.  Patient did have 1 additional small-volume hemoptysis while in the ED which she describes as approximately the size of a dime.    Regarding patient's sinus symptoms, this was discussed with the on-call pulmonologist.  There are certainly processes that would explain both sinus and pulmonary symptoms such as vasculitis.  Based on recent lack of response to retreatment with doxycycline as well as normal appearance of sinus CT and no fever would not start antibiotics for sinusitis.  Patient with patchy infiltrate on chest x-ray.  Certainly atypical infection remains on the differential however no overt infectious symptoms and therefore would defer antibiotics at this time awaiting pulmonology consultation.    Disposition   The patient was admitted to the hospital.     Diagnosis     ICD-10-CM    1. Hemoptysis  R04.2 CANCELED: Adult Pulmonary Medicine  Referral               Scribe Disclosure:  I, Agustina Antunez, am serving as a scribe at 8:16 PM on 7/5/2025 to document services personally performed by Samina Hinton MD based on my observations and the provider's statements to me.        Jamaal  Samina Rucker MD  07/06/25 2558

## 2025-07-06 NOTE — PROGRESS NOTES
[facility]  Hospitalist Progress Note  Venessa Coombs PA-C, PUJA 07/06/2025    Reason for Stay (Diagnosis): hemoptysis         Assessment and Plan:      Summary of Stay:   Denilson Santana is a 79 year old male w/PMH hypothyroidism, DLD, SHANIQUE, recent pneumonia and sinus infections who presents 7/5/25 with hemoptysis and found to have abnormal chest CT.    Problem List  Hemoptysis  Ground glass opacites LEVI on CT  Recent sinusitis and pnuemonia  -reports being treated with antibiotics twice over past 6 weeks for sinusitis as well as nasal sprays, ibuprofen, etc. Also treated for pneumonia in recent past as well. Today had small amount of bright red hemoptysis after coughing, decreasing amounts each time. Not on blood thinners other than recent NSAID use.   -in ED Hgb was stable but CT showed ground glass in LEVI as well as some findings suggesting mild chronic fibrosis. 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 Bronch labs pending.      Leukocytosis  -likely from above, ctm     Hx hypothytroidism, DLD, ? SHANIQUE, remote smoking history  -reports only taking medication for thryoid at home so synthroid 100mcg resumed  -not sure if he uses CPAP regularly     Diet:  NPO  DVT Prophylaxis: Pneumatic Compression Devices  Castillo Catheter: Not present  Lines: None     Cardiac Monitoring: None  Code Status:  full code           Interval History (Subjective):      Stable, feeling well currently. No pain.                   Physical Exam:      Last Vital Signs:  BP (!) 155/78 (BP Location: Right arm)   Pulse 69   Temp 98.4  F (36.9  " C) (Oral)   Resp 16   Ht 1.829 m (6')   Wt 108.5 kg (239 lb 1.6 oz)   SpO2 96%   BMI 32.43 kg/m      Constitutional: Awake, alert, cooperative, no apparent distress     Respiratory: Clear to auscultation bilaterally, no crackles or wheezing   Cardiovascular: Regular rate and rhythm, normal S1 and S2, and no murmur noted   Abdomen: Normal bowel sounds, soft, non-distended, non-tender   Skin: No rashes, no cyanosis, dry to touch   Neuro: Alert and oriented x3, no weakness, numbness, memory loss   Extremities: No edema, normal range of motion   Other(s):        All other systems: Negative          Medications:      All current medications were reviewed with changes reflected in problem list.         Data:      All new lab and imaging data was reviewed.   Labs:     Recent Labs   Lab 07/06/25 0621 07/05/25 2121   WBC 10.6 11.4*   HGB 13.2* 14.7   HCT 40.5 45.5   MCV 86 88    220     No results for input(s): \"CULT\" in the last 168 hours.  No results for input(s): \"PH\", \"PHARTERIAL\", \"PO2\", \"[id number]\", \"SAT\", \"PCO2\", \"HCO3\", \"BASEEXCESS\", \"JENARO\", \"BEB\" in the last 168 hours.    Invalid input(s): \"[id number]\"       Lab Results   Component Value Date     07/06/2025     07/05/2025    Lab Results   Component Value Date    CHLORIDE 107 07/06/2025    CHLORIDE 105 07/05/2025    Lab Results   Component Value Date    BUN 17.1 07/06/2025    BUN 21.7 07/05/2025      Lab Results   Component Value Date    POTASSIUM 4.1 07/06/2025    POTASSIUM 4.9 07/05/2025    Lab Results   Component Value Date    CO2 21 07/06/2025    CO2 26 07/05/2025    Lab Results   Component Value Date    CR 0.91 07/06/2025    CR 1.11 07/05/2025        No results for input(s): \"NTBNPI\", \"NTBNP\" in the last 168 hours.  Recent Labs   Lab 07/06/25 0621 07/05/25 2121   WBC 10.6 11.4*   HGB 13.2* 14.7   HCT 40.5 45.5   MCV 86 88    220     Recent Labs   Lab 07/06/25 0621 07/05/25 2121    142   POTASSIUM 4.1 4.9   CHLORIDE " "107 105   CO2 21* 26   ANIONGAP 11 11   * 106*   BUN 17.1 21.7   CR 0.91 1.11   GFRESTIMATED 87 68   TOI 8.7* 9.2     Recent Labs   Lab 07/05/25 2121   DD 0.69*     Recent Labs   Lab 07/06/25  0621 07/05/25 2121    142   POTASSIUM 4.1 4.9   CHLORIDE 107 105   CO2 21* 26   * 106*     No results for input(s): \"SED\", \"CRP\" in the last 168 hours.  Recent Labs   Lab 07/05/25 2121   INR 1.05     No results for input(s): \"TSH\" in the last 168 hours.  No results for input(s): \"COLOR\", \"APPEARANCE\", \"URINEGLC\", \"URINEBILI\", \"URINEKETONE\", \"SG\", \"UBLD\", \"URINEPH\", \"PROTEIN\", \"UROBILINOGEN\", \"NITRITE\", \"LEUKEST\", \"RBCU\", \"WBCU\" in the last 168 hours.  No results for input(s): \"URINEKETONE\" in the last 168 hours.    Invalid input(s): \"KETONEURINE\"   Imaging:   Results for orders placed or performed during the hospital encounter of 07/05/25   XR Chest 2 Views    Narrative    EXAM: XR CHEST 2 VIEWS  LOCATION: [facility]  DATE: 7/5/2025    INDICATION: cough blood tinged  COMPARISON: 9/21/2024      Impression    IMPRESSION: New hazy airspace opacities throughout left lung suspicious for pneumonia in the setting of cough. Clear right lung. No pleural effusion. Normal heart size and pulmonary vascularity. Aortic calcifications.   CT Chest Pulmonary Embolism w Contrast    Narrative    EXAM: CT CHEST PULMONARY EMBOLISM W CONTRAST  LOCATION: [facility]  DATE: 7/5/2025    INDICATION: hemoptysis, elevated ddimer  COMPARISON: Cardiac CT 10/1/2019  TECHNIQUE: CT chest pulmonary angiogram during arterial phase injection of IV contrast. Multiplanar reformats and MIP reconstructions were performed. Dose reduction techniques were used.   CONTRAST: 85mL Isovue 370    FINDINGS:  ANGIOGRAM CHEST: Pulmonary arteries are normal caliber and negative for pulmonary emboli. Thoracic aorta is negative for dissection. No CT evidence of right heart strain.    LUNGS AND PLEURA: There is " new extensive somewhat geographic groundglass opacity involving much of left upper lobe as well as mild groundglass opacities involving lower lobes posteriorly. The findings are nonspecific and could certainly relate to the   patient's reported hemoptysis though atypical pneumonia or acute alveolitis related to underlying chronic interstitial lung disease also possible. There is a mild reticular peripheral interstitial pattern suggesting potential underlying pulmonary   fibrosis. No pleural effusion.    MEDIASTINUM/AXILLAE: A few minimally prominent lymph nodes present likely reactive.    CORONARY ARTERY CALCIFICATION: None.    UPPER ABDOMEN: Cholecystectomy. Small hepatic cyst.    MUSCULOSKELETAL: Normal.      Impression    IMPRESSION:  1.  Groundglass opacities predominantly involving left upper lobe are nonspecific but could relate to alveolar hemorrhage in a patient with hemoptysis. Atypical pneumonia or even acute cellulitis relating to chronic interstitial lung disease are other   considerations.  2.  There does appear to be underlying reticular interstitial prominence suggesting mild chronic fibrosis.

## 2025-07-06 NOTE — PLAN OF CARE
ROOM # 227    Living Situation (if not independent, order SW consult): home with spouse  Facility name:  : [facility] ( Spouse)    Activity level at baseline: Independent  Activity level on admit: Independent    Who will be transporting you at discharge: Spouse.    Patient registered to observation; given Patient Bill of Rights; given the opportunity to ask questions about observation status and their plan of care.  Patient has been oriented to the observation room, bathroom and call light is in place.    Discussed discharge goals and expectations with patient/family.         Goal Outcome Evaluation:

## 2025-07-06 NOTE — ED NOTES
[facility]  ED Nurse Handoff Report    ED Chief complaint: Hemoptysis  . ED Diagnosis:   Final diagnoses:   Hemoptysis       Allergies:   Allergies   Allergen Reactions    Amoxicillin-Pot Clavulanate     Erythromycin Diarrhea and GI Disturbance    Tobacco     Trees      Red Maple    Weed [Grass]      Grass and Weeds        Code Status: Full Code    Activity level - Baseline/Home:  independent.  Activity Level - Current:   independent.   Lift room needed: No.   Bariatric: No   Needed: No   Isolation: NA.   Infection: Not Applicable.     Respiratory status: Room air    Vital Signs (within 30 minutes):   Vitals:    07/05/25 1954 07/05/25 1955   BP:  128/81   Pulse: 93    Resp: 20    Temp: 97  F (36.1  C)    TempSrc: Temporal    SpO2: 100%    Weight: 109.3 kg (240 lb 15.4 oz)    Height: 1.829 m (6')        Cardiac Rhythm:  ,      Pain level:    Patient confused: No.   Patient Falls Risk: nonskid shoes/slippers when out of bed, arm band in place, and patient and family education.   Elimination Status: Has voided     Patient Report - Initial Complaint: Denilson Santana is a 79 year old male with a history of hyperlipidemia, hypertension, and hypothyroidism presenting for evaluation of hemoptysis. The patient has been having sinus trouble for the 5-6 weeks and has been seen twice at the urgency room. The patient has also been to the dentist since his sinus pain sometimes extends to his jaw, and his teeth looked fine. For the last two weeks, he has been taking Flonase daily. Zyrtec non-drowsy does not help his symptoms and normal Zyrtec puts him to sleep. He also uses a saline spray daily and rinses his mouth with glycerin. Despite these treatments, he still has a nasal drip and sinus congestion. This afternoon, when in the shower, he coughed up blood for the very first time. He coughed up a little blood while in the emergency department which was red. He denies coughing up mucus. He  states that he has had pneumonia 5 times in the past, and this does not feel like that, his lungs feel clear. The patient's wife reports that the patient has been experiencing shortness of breath when walking outside for the last month. The patient reports that he is only short of breath when outside, and he wonders if it is related to the heat and humidity. He has been able to workout indoors for an hour without any trouble breathing or chest pain. The patient takes daily levothyroxine. He has traveled to [place] and [place] in the last year. There is a family history of lung cancer in his mother. The patient denies recent illness, fever, chest pain, leg pain or edema, pain with deep breathing, travel in the last month, history of diabetes, history of tuberculosis, exposure to tuberculosis, and history of blood clots in his legs or lungs. .   Focused Assessment: Pt reports he was in the shower and coughed and coughed up blood 3 different times. Pt denies blood thinners. Pt reports he has been having problems with his sinus. Pt recently to cialis      Abnormal Results:   Labs Ordered and Resulted from Time of ED Arrival to Time of ED Departure   BASIC METABOLIC PANEL - Abnormal       Result Value    Sodium 142      Potassium 4.9      Chloride 105      Carbon Dioxide (CO2) 26      Anion Gap 11      Urea Nitrogen 21.7      Creatinine 1.11      GFR Estimate 68      Calcium 9.2      Glucose 106 (*)    D DIMER QUANTITATIVE - Abnormal    D-Dimer Quantitative 0.69 (*)    CBC WITH PLATELETS AND DIFFERENTIAL - Abnormal    WBC Count 11.4 (*)     RBC Count 5.19      Hemoglobin 14.7      Hematocrit 45.5      MCV 88      MCH 28.3      MCHC 32.3      RDW 15.2 (*)     Platelet Count 220      % Neutrophils 82      % Lymphocytes 9      % Monocytes 7      % Eosinophils 2      % Basophils 0      % Immature Granulocytes 0      NRBCs per 100 WBC 0      Absolute Neutrophils 9.3 (*)     Absolute Lymphocytes 1.1      Absolute Monocytes  0.7      Absolute Eosinophils 0.2      Absolute Basophils 0.0      Absolute Immature Granulocytes 0.1      Absolute NRBCs 0.0     INR - Normal    INR 1.05      PT 13.8     ABO/RH TYPE AND SCREEN        CT Chest Pulmonary Embolism w Contrast   Final Result   IMPRESSION:   1.  Groundglass opacities predominantly involving left upper lobe are nonspecific but could relate to alveolar hemorrhage in a patient with hemoptysis. Atypical pneumonia or even acute cellulitis relating to chronic interstitial lung disease are other    considerations.   2.  There does appear to be underlying reticular interstitial prominence suggesting mild chronic fibrosis.      XR Chest 2 Views   Final Result   IMPRESSION: New hazy airspace opacities throughout left lung suspicious for pneumonia in the setting of cough. Clear right lung. No pleural effusion. Normal heart size and pulmonary vascularity. Aortic calcifications.          Treatments provided: See MAR  Family Comments: At bedside  OBS brochure/video discussed/provided to patient:  N/A  ED Medications:   Medications   iopamidol (ISOVUE-370) solution 500 mL (85 mLs Intravenous $Given 7/5/25 2230)   sodium chloride 0.9 % bag for CT scan flush (100 mLs Intravenous $Given 7/5/25 2230)       Drips infusing:  No  For the majority of the shift this patient was Green.   Interventions performed were NA.    Sepsis treatment initiated: No    Cares/treatment/interventions/medications to be completed following ED care: All inpatient orders    ED Nurse Name: Eri Esquivel RN  11:47 PM

## 2025-07-07 VITALS
HEART RATE: 65 BPM | DIASTOLIC BLOOD PRESSURE: 71 MMHG | SYSTOLIC BLOOD PRESSURE: 150 MMHG | RESPIRATION RATE: 6 BRPM | BODY MASS INDEX: 32.38 KG/M2 | HEIGHT: 72 IN | TEMPERATURE: 97.8 F | OXYGEN SATURATION: 95 % | WEIGHT: 239.1 LBS

## 2025-07-07 LAB
ACID FAST STAIN (ARUP): NORMAL
ACID FAST STAIN (ARUP): NORMAL
ANION GAP SERPL CALCULATED.3IONS-SCNC: 10 MMOL/L (ref 7–15)
ATRIAL RATE - MUSE: 79 BPM
BACTERIA BRONCH: NORMAL
BASOPHILS # BLD AUTO: 0 10E3/UL (ref 0–0.2)
BASOPHILS NFR BLD AUTO: 0 %
BUN SERPL-MCNC: 17 MG/DL (ref 8–23)
CALCIUM SERPL-MCNC: 9.2 MG/DL (ref 8.8–10.4)
CD19 CELLS NFR BRONCH: 1 %
CD3 CELLS NFR BRONCH: 91 %
CD3+CD4+ CELLS NFR BRONCH: 56 %
CD3+CD4+ CELLS/CD3+CD8+ CLL BRONCH: 1.56 %
CD3+CD8+ CELLS NFR BRONCH: 36 %
CD3-CD16+CD56+ CELLS NFR SPEC: 3 %
CHLORIDE SERPL-SCNC: 104 MMOL/L (ref 98–107)
CREAT SERPL-MCNC: 0.85 MG/DL (ref 0.67–1.17)
CRP SERPL-MCNC: 33.31 MG/L
DIASTOLIC BLOOD PRESSURE - MUSE: NORMAL MMHG
EGFRCR SERPLBLD CKD-EPI 2021: 89 ML/MIN/1.73M2
EOSINOPHIL # BLD AUTO: 0 10E3/UL (ref 0–0.7)
EOSINOPHIL NFR BLD AUTO: 0 %
ERYTHROCYTE [DISTWIDTH] IN BLOOD BY AUTOMATED COUNT: 15.2 % (ref 10–15)
GLUCOSE SERPL-MCNC: 150 MG/DL (ref 70–99)
HCO3 SERPL-SCNC: 23 MMOL/L (ref 22–29)
HCT VFR BLD AUTO: 41.5 % (ref 40–53)
HGB BLD-MCNC: 13.6 G/DL (ref 13.3–17.7)
HGB BLD-MCNC: 13.6 G/DL (ref 13.3–17.7)
HGB BLD-MCNC: 13.7 G/DL (ref 13.3–17.7)
HGB BLD-MCNC: 14.5 G/DL (ref 13.3–17.7)
HSV1 DNA SPEC QL NAA+PROBE: NEGATIVE
HSV2 DNA SPEC QL NAA+PROBE: NEGATIVE
IMM GRANULOCYTES # BLD: 0.1 10E3/UL
IMM GRANULOCYTES NFR BLD: 1 %
INTERPRETATION ECG - MUSE: NORMAL
LYMPHOCYTE SUBSET BRONCHIAL EXTERNAL COMMENT: NORMAL
LYMPHOCYTES # BLD AUTO: 0.5 10E3/UL (ref 0.8–5.3)
LYMPHOCYTES NFR BLD AUTO: 5 %
MCH RBC QN AUTO: 28.2 PG (ref 26.5–33)
MCHC RBC AUTO-ENTMCNC: 32.8 G/DL (ref 31.5–36.5)
MCV RBC AUTO: 86 FL (ref 78–100)
MCV RBC AUTO: 87 FL (ref 78–100)
MONOCYTES # BLD AUTO: 0.1 10E3/UL (ref 0–1.3)
MONOCYTES NFR BLD AUTO: 1 %
NEUTROPHILS # BLD AUTO: 8.6 10E3/UL (ref 1.6–8.3)
NEUTROPHILS NFR BLD AUTO: 93 %
NRBC # BLD AUTO: 0 10E3/UL
NRBC BLD AUTO-RTO: 0 /100
P AXIS - MUSE: 45 DEGREES
PLATELET # BLD AUTO: 209 10E3/UL (ref 150–450)
POTASSIUM SERPL-SCNC: 4.2 MMOL/L (ref 3.4–5.3)
PR INTERVAL - MUSE: 172 MS
QRS DURATION - MUSE: 112 MS
QT - MUSE: 384 MS
QTC - MUSE: 440 MS
R AXIS - MUSE: -56 DEGREES
RBC # BLD AUTO: 4.82 10E6/UL (ref 4.4–5.9)
SODIUM SERPL-SCNC: 137 MMOL/L (ref 135–145)
SPECIMEN TYPE: NORMAL
SYSTOLIC BLOOD PRESSURE - MUSE: NORMAL MMHG
T AXIS - MUSE: 33 DEGREES
VENTRICULAR RATE- MUSE: 79 BPM
WBC # BLD AUTO: 9.3 10E3/UL (ref 4–11)

## 2025-07-07 PROCEDURE — 94640 AIRWAY INHALATION TREATMENT: CPT

## 2025-07-07 PROCEDURE — 86140 C-REACTIVE PROTEIN: CPT | Performed by: INTERNAL MEDICINE

## 2025-07-07 PROCEDURE — 86331 IMMUNODIFFUSION OUCHTERLONY: CPT | Performed by: INTERNAL MEDICINE

## 2025-07-07 PROCEDURE — 86606 ASPERGILLUS ANTIBODY: CPT | Performed by: INTERNAL MEDICINE

## 2025-07-07 PROCEDURE — 86038 ANTINUCLEAR ANTIBODIES: CPT | Performed by: INTERNAL MEDICINE

## 2025-07-07 PROCEDURE — 999N000157 HC STATISTIC RCP TIME EA 10 MIN

## 2025-07-07 PROCEDURE — 250N000013 HC RX MED GY IP 250 OP 250 PS 637: Performed by: PHYSICIAN ASSISTANT

## 2025-07-07 PROCEDURE — 99239 HOSP IP/OBS DSCHRG MGMT >30: CPT | Performed by: PHYSICIAN ASSISTANT

## 2025-07-07 PROCEDURE — 80048 BASIC METABOLIC PNL TOTAL CA: CPT | Performed by: PHYSICIAN ASSISTANT

## 2025-07-07 PROCEDURE — 250N000009 HC RX 250: Performed by: INTERNAL MEDICINE

## 2025-07-07 PROCEDURE — 86036 ANCA SCREEN EACH ANTIBODY: CPT | Performed by: INTERNAL MEDICINE

## 2025-07-07 PROCEDURE — 250N000013 HC RX MED GY IP 250 OP 250 PS 637: Performed by: HOSPITALIST

## 2025-07-07 PROCEDURE — 36415 COLL VENOUS BLD VENIPUNCTURE: CPT | Performed by: PHYSICIAN ASSISTANT

## 2025-07-07 PROCEDURE — 82085 ASSAY OF ALDOLASE: CPT | Performed by: INTERNAL MEDICINE

## 2025-07-07 PROCEDURE — 250N000011 HC RX IP 250 OP 636: Mod: JZ | Performed by: INTERNAL MEDICINE

## 2025-07-07 PROCEDURE — 85018 HEMOGLOBIN: CPT | Performed by: PHYSICIAN ASSISTANT

## 2025-07-07 PROCEDURE — 99232 SBSQ HOSP IP/OBS MODERATE 35: CPT | Performed by: STUDENT IN AN ORGANIZED HEALTH CARE EDUCATION/TRAINING PROGRAM

## 2025-07-07 PROCEDURE — 85004 AUTOMATED DIFF WBC COUNT: CPT | Performed by: PHYSICIAN ASSISTANT

## 2025-07-07 PROCEDURE — 85025 COMPLETE CBC W/AUTO DIFF WBC: CPT | Performed by: PHYSICIAN ASSISTANT

## 2025-07-07 RX ORDER — PREDNISONE 20 MG/1
TABLET ORAL
Qty: 20 TABLET | Refills: 0 | Status: SHIPPED | OUTPATIENT
Start: 2025-07-07

## 2025-07-07 RX ADMIN — METHYLPREDNISOLONE SODIUM SUCCINATE 125 MG: 125 INJECTION, POWDER, FOR SOLUTION INTRAMUSCULAR; INTRAVENOUS at 11:45

## 2025-07-07 RX ADMIN — TRANEXAMIC ACID 500 MG: 1 INJECTION, SOLUTION INTRAVENOUS at 07:29

## 2025-07-07 RX ADMIN — ACETAMINOPHEN 650 MG: 325 TABLET ORAL at 08:17

## 2025-07-07 RX ADMIN — METHYLPREDNISOLONE SODIUM SUCCINATE 125 MG: 125 INJECTION, POWDER, FOR SOLUTION INTRAMUSCULAR; INTRAVENOUS at 01:09

## 2025-07-07 RX ADMIN — LEVOTHYROXINE SODIUM 100 MCG: 0.1 TABLET ORAL at 08:14

## 2025-07-07 RX ADMIN — METHYLPREDNISOLONE SODIUM SUCCINATE 125 MG: 125 INJECTION, POWDER, FOR SOLUTION INTRAMUSCULAR; INTRAVENOUS at 06:12

## 2025-07-07 ASSESSMENT — ACTIVITIES OF DAILY LIVING (ADL)
ADLS_ACUITY_SCORE: 33
ADLS_ACUITY_SCORE: 23
ADLS_ACUITY_SCORE: 33
ADLS_ACUITY_SCORE: 23
ADLS_ACUITY_SCORE: 33
ADLS_ACUITY_SCORE: 23
ADLS_ACUITY_SCORE: 33
ADLS_ACUITY_SCORE: 23

## 2025-07-07 NOTE — PLAN OF CARE
"Goal Outcome Evaluation:      Plan of Care Reviewed With: patient    Overall Patient Progress: improvingOverall Patient Progress: improving    Outcome Evaluation: Pt A&Ox4. Pain in lower back 4/10 tylenol given, up in chair. Denies n/v/d/sob/dizziness/chest pain. Numbness/tingling in BLE, pt states having neuropathy. WDL breath/heart sounds. Regular diet. Saline locked, clean/dry/intact. Independent, educated pt if dizzy to call nurse station. Pulmonary following. Discharge today.    BP (!) (P) 154/81 (BP Location: Left arm)   Pulse (P) 68   Temp (P) 97.9  F (36.6  C) (Oral)   Resp (P) 16   Ht 1.829 m (6')   Wt 108.5 kg (239 lb 1.6 oz)   SpO2 (P) 95%   BMI 32.43 kg/m      Problem: Adult Inpatient Plan of Care  Goal: Plan of Care Review  Description: The Plan of Care Review/Shift note should be completed every shift.  The Outcome Evaluation is a brief statement about your assessment that the patient is improving, declining, or no change.  This information will be displayed automatically on your shift  note.  Outcome: Progressing  Flowsheets (Taken 7/7/2025 1111)  Outcome Evaluation: Pt A&Ox4. Pain in lower back 4/10 tylenol given, up in chair. Denies n/v/d/sob/dizziness/chest pain. Numbness/tingling in BLE, pt states having neuropathy. WDL breath/heart sounds. Regular diet. Saline locked, clean/dry/intact. Independent, educated pt if dizzy to call nurse station. Pulmonary following. Discharge pending.  Plan of Care Reviewed With: patient  Overall Patient Progress: improving  Goal: Patient-Specific Goal (Individualized)  Description: You can add care plan individualizations to a care plan. Examples of Individualization might be:  \"Parent requests to be called daily at 9am for status\", \"I have a hard time hearing out of my right ear\", or \"Do not touch me to wake me up as it startles  me\".  Outcome: Progressing  Goal: Absence of Hospital-Acquired Illness or Injury  Outcome: Progressing  Intervention: Identify and " Manage Fall Risk  Recent Flowsheet Documentation  Taken 7/7/2025 0813 by Benita Vega, RN  Safety Promotion/Fall Prevention:   supervised activity   safety round/check completed   room organization consistent   room near nurse's station   room door open   patient and family education   nonskid shoes/slippers when out of bed   increase visualization of patient   increased rounding and observation   clutter free environment maintained   assistive device/personal items within reach   activity supervised  Intervention: Prevent and Manage VTE (Venous Thromboembolism) Risk  Recent Flowsheet Documentation  Taken 7/7/2025 0813 by Benita Vega RN  VTE Prevention/Management: SCDs off (sequential compression devices)  Intervention: Prevent Infection  Recent Flowsheet Documentation  Taken 7/7/2025 0813 by Benita Vega, RN  Infection Prevention:   single patient room provided   rest/sleep promoted   hand hygiene promoted   equipment surfaces disinfected   environmental surveillance performed  Goal: Optimal Comfort and Wellbeing  Outcome: Progressing  Intervention: Provide Person-Centered Care  Recent Flowsheet Documentation  Taken 7/7/2025 0813 by Benita Vega, RN  Trust Relationship/Rapport:   reassurance provided   questions answered   empathic listening provided   choices provided   care explained  Goal: Readiness for Transition of Care  Outcome: Progressing     Problem: Delirium  Goal: Optimal Coping  Outcome: Progressing  Intervention: Optimize Psychosocial Adjustment to Delirium  Recent Flowsheet Documentation  Taken 7/7/2025 0813 by Benita Vega, RN  Family/Support System Care:   self-care encouraged   support provided  Goal: Improved Behavioral Control  Outcome: Progressing  Intervention: Minimize Safety Risk  Recent Flowsheet Documentation  Taken 7/7/2025 0813 by Benita Vega, RN  Enhanced Safety Measures:   review medications for side effects with activity   patient/family  teach back on injury risk   assistive devices when indicated   pain management  Trust Relationship/Rapport:   reassurance provided   questions answered   empathic listening provided   choices provided   care explained  Goal: Improved Attention and Thought Clarity  Outcome: Progressing  Goal: Improved Sleep  Outcome: Progressing

## 2025-07-07 NOTE — PLAN OF CARE
Patient's After Visit Summary was reviewed with patient and/or spouse.   Patient verbalized understanding of After Visit Summary, recommended follow up and was given an opportunity to ask questions.   Discharge medications sent home with patient/family: YES   Discharged with spouse.  Pt medically cleared for discharge. All pt belongings sent with pt. Spouse transporting home.

## 2025-07-07 NOTE — PROGRESS NOTES
[facility] Pulmonary Consult Note    Date of Service: 07/07/25    Assessment and Recommendations:  79M HLD admitted 7/5 w/ hemoptysis. Found to have LEVI groundglass opacities. Bronchoscopy performed 7/6 concerning for diffuse alveolar hemorrhage. Hemoptysis has resolved. Given exposure history, hypersensitivity pneumonitis is of concern. Bronchoscopy and lab evaluation still pending. Ultimately, given how he is feeling, I feel this can be worked up as an outpatient.     - prednisone taper: 40mg x 5d, 30mg x 3d, 20mg x 3d, 10mg x 3d  - recommend outpatient pulmonary follow up w/ repeat CT chest w/o contrast and PFTs in ~3 months   - OK to discharge from a pulmonary perspective  - strict return precautions given    Pulmonary will sign off.      Chief Complaint   Patient presents with    Hemoptysis       Summary:  On RA. CRP 33.31. Feeling well today. Denies RYAN, SOB at rest, chest pain or tightness, F/C. Slight dry cough. No further hemoptysis. He states that when he has [place] [employer] [place] those days he will notice chest congestion and mild SOB. Otherwise, he feels well. Exercises multiple times per week w/o issues.     10 point review of systems negative, aside from that mentioned above    BP (!) (P) 154/81 (BP Location: Left arm)   Pulse (P) 68   Temp (P) 97.9  F (36.6  C) (Oral)   Resp (P) 16   Ht 1.829 m (6')   Wt 108.5 kg (239 lb 1.6 oz)   SpO2 (P) 95%   BMI 32.43 kg/m    Gen: NAD  HEENT: anicteric, OP clear, Mallampati I  Card: RRR  Pulm: clear bilaterally   Abd: soft, NTND  MSK: no LE edema, no acute joint abnormalities  Skin: no obvious rash  Psych: normal affect  Neuro: answering questions appropriately     Labs: personally reviewed    Imaging: personally reviewed    Past Medical History:   Diagnosis Date    Actinic keratosis     Allergic state     Bronchitis     CPAP (continuous positive airway pressure) dependence     Heart murmur     Hypercholesterolemia      Hyperlipidemia     Lumbago     Sleep apnea     Thyroid disease        Past Surgical History:   Procedure Laterality Date    ANKLE SURGERY      BRONCHOSCOPY (RIGID OR FLEXIBLE), DIAGNOSTIC N/A 7/6/2025    Procedure: BRONCHOSCOPY, WITH BRONCHOALVEOLAR LAVAGE;  Surgeon: Wyatt Zelaya MD;  Location:  GI    CHOLECYSTECTOMY      ENT SURGERY      tonsillectomy,adenoidectomy    EXCISE LESION NECK  6/3/2014    Procedure: EXCISE LESION NECK;  Surgeon: Miki Wesley MD;  Location: [facility]    HERNIORRHAPHY INGUINAL  6/3/2014    Procedure: HERNIORRHAPHY INGUINAL;  Surgeon: Miki Wesley MD;  Location: [facility]       History reviewed. No pertinent family history.    Social History     Socioeconomic History    Marital status:      Spouse name: Not on file    Number of children: Not on file    Years of education: Not on file    Highest education level: Not on file   Occupational History    Not on file   Tobacco Use    Smoking status: Former     Types: Cigarettes    Smokeless tobacco: Never    Tobacco comments:     quit 29+ yrs ago (03/26/11)   Substance and Sexual Activity    Alcohol use: No    Drug use: No    Sexual activity: Not on file   Other Topics Concern    Not on file   Social History Narrative    Not on file     Social Drivers of Health     Financial Resource Strain: Low Risk  (7/6/2025)    Financial Resource Strain     Within the past 12 months, have you or your family members you live with been unable to get utilities (heat, electricity) when it was really needed?: No   Food Insecurity: Low Risk  (7/6/2025)    Food Insecurity     Within the past 12 months, did you worry that your food would run out before you got money to buy more?: No     Within the past 12 months, did the food you bought just not last and you didn t have money to get more?: Not on file   Transportation Needs: Not on file   Physical Activity: Not on file   Stress: Not on file   Social Connections: Not on file   Interpersonal Safety:  Low Risk  (7/6/2025)    Interpersonal Safety     Do you feel physically and emotionally safe where you currently live?: Yes     Within the past 12 months, have you been hit, slapped, kicked or otherwise physically hurt by someone?: No     Within the past 12 months, have you been humiliated or emotionally abused in other ways by your partner or ex-partner?: Not on file   Housing Stability: Low Risk  (7/6/2025)    Housing Stability     Do you have housing? : Yes     Are you worried about losing your housing?: Not on file       Jaime Cerda MD  Pulmonary and Critical Care Medicine  [facility]

## 2025-07-07 NOTE — DISCHARGE SUMMARY
[facility]  Hospitalist Discharge Summary      Date of Admission:  7/5/2025  Date of Discharge:  7/7/2025  Discharging Provider: Shaina Vallejo PA-C  Discharge Service: Hospitalist Service    Discharge Diagnoses   Episode of Hemoptysis s/p bronchoscopy  Suspected Hypersensitivity Pneumonitis   Ground glass opacity on LEVI    Hypothyroidism     Clinically Significant Risk Factors     # Obesity: Estimated body mass index is 32.43 kg/m  as calculated from the following:    Height as of this encounter: 1.829 m (6').    Weight as of this encounter: 108.5 kg (239 lb 1.6 oz).       Follow-ups Needed After Discharge   Follow-up Appointments       Follow Up      Follow up with [facility] Department of Pulmonary Medicine after 3 months . for hospital follow- up. The following labs/tests are recommended: Pulm function testing and Ct chest to be performed prior to appt .                Unresulted Labs Ordered in the Past 30 Days of this Admission       Date and Time Order Name Status Description    7/7/2025 12:01 AM ANCA IgG by IFA with Reflex to Titer In process     7/7/2025 12:01 AM Hypersensitivity Pneumonitis 2 In process     7/7/2025 12:01 AM Hypersensitivity pneumonitis In process     7/7/2025 12:01 AM Aldolase In process     7/7/2025 12:01 AM Anti Nuclear Jennifer IgG by IFA with Reflex In process     7/6/2025 11:13 AM Myeloperoxidase and Proteinase 3 Panel In process     7/6/2025 11:13 AM Cyclic Citrullinated Peptide Antibody IgG In process     7/6/2025 11:13 AM Scleroderma Antibody Scl70 GURWINDER IgG In process     7/6/2025 11:13 AM SSB La GURWINDER Antibody IgG In process     7/6/2025 11:13 AM SSA Ro GURWINDER Antibody IgG In process     7/6/2025 11:13 AM Mica 1 Antibody IgG In process     7/6/2025 10:51 AM Acid-Fast Bacilli Culture and Stain In process     7/6/2025  9:57 AM Legionella species PCR - BAL Site 1 In process     7/6/2025  9:57 AM Histoplasma Capsulatum Agn Non Blood In process     7/6/2025  9:57 AM  Mycoplasma pneumoniae by PCR - BAL Site 1 In process     7/6/2025  9:57 AM Pneumocystis jirovecil by PCR - BAL Site 1 In process     7/6/2025  9:57 AM Aspergillus Galactomannan Agn Bronchial - BAL Site 1 In process     7/6/2025  9:57 AM Legionella culture - BAL Site 1 Preliminary     7/6/2025  9:57 AM Nocardia culture - BAL Site 1 In process     7/6/2025  9:57 AM Fungus Culture, non-blood - BAL Site 1 In process     7/6/2025  9:57 AM Acid-Fast Bacilli Culture and Stain In process     7/6/2025  9:57 AM Respiratory Aerobic Bacterial Culture Preliminary     7/6/2025  9:57 AM Cytology non gyn - BAL Sites In process         These results will be followed up by PCP and Pulm    Discharge Disposition   Discharged to home  Condition at discharge: Stable    Hospital Course   Denilson Santana is a 79 year old male w/PMH hypothyroidism, DLD, SHANIQUE, recent pneumonia and sinus infections who presents 7/5/25 with hemoptysis and found to have abnormal chest CT.     Problem List  Hemoptysis  Ground glass opacites LEVI on CT  Recent sinusitis and pnuemonia  -reports being treated with antibiotics twice over past 6 weeks for sinusitis as well as nasal sprays, ibuprofen, etc. Also treated for pneumonia in recent past as well. Today had small amount of bright red hemoptysis after coughing, decreasing amounts each time. Not on blood thinners other than recent NSAID use.   -in ED Hgb was stable but CT showed ground glass in LEVI as well as some findings suggesting mild chronic fibrosis. 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 Bronch labs pending.   - Sxs improved in the next 24 hrs. D/w Pulm, pt  will be discharged with po steroids taper and Pulm follow up.   Will get PFT and Repeat CT chest prior to visit        Consultations This Hospital Stay   PULMONARY IP CONSULT  PHARMACY DISCHARGE EDUCATION BY PHARMACIST    Code Status   Full Code    Time Spent on this Encounter   I, Shaina Vallejo PA-C, personally saw the patient today and spent greater than 30 minutes discharging this patient.       Shaina Vallejo PA-C  [facility] OBSERVATION DEPT  [address]  [facility] [address]  Phone: [phone]  ______________________________________________________________________    Physical Exam   Vital Signs: Temp: (P) 97.9  F (36.6  C) Temp src: (P) Oral BP: (!) (P) 154/81 Pulse: (P) 68   Resp: (P) 16 SpO2: (P) 95 % O2 Device: (P) None (Room air)    Weight: 239 lbs 1.6 oz  GENERAL:  Comfortable.  PSYCH: pleasant, oriented, No acute distress.  HEENT:  PERRLA. Normal conjunctiva, normal hearing, nasal mucosa and Oropharynx are normal.  NECK:  Supple, no neck vein distention, adenopathy or bruits, normal thyroid.  HEART:  Normal S1, S2 with no murmur, no pericardial rub, gallops or S3 or S4.  LUNGS:  Clear to auscultation, normal Respiratory effort. No wheezing, rales or ronchi.  ABDOMEN:  Soft, no hepatosplenomegaly, normal bowel sounds. Non-tender, non distended.   EXTREMITIES:  No pedal edema, +2 pulses bilateral and equal.  SKIN:  Dry to touch, No rash, wound or ulcerations.  NEUROLOGIC:  CN 2-12 intact, BL 5/5 symmetric upper and lower extremity strength, sensation is intact with no focal deficits.          Primary Care Physician   Physician No Ref-Primary    Discharge Orders      CT Chest w & w/o contrast     Adult Pulmonary Medicine  Referral      Reason for your hospital stay    You were admitted for concerns of acute hemoptysis. You underwent bronchoscopy and was seen by the Pulmonologist. Multiple labs were sent and will be pending. The pulmonary team will call you if  your results come back that needs addressing prior to your appointment. You will need to be on a steroid taper and will need to refrain from further [place] [employer] till we get more details of your study. WE have ordered a CT chest and pulm function testing to be performed in 3 months. Get these done prior to seeing the Pulmonologist in 3 months.   Call the Pulm team to make an appt for 3-3.5 mos follow up.    Return to the ED if you have recurrent coughing of blood     Activity    Your activity upon discharge: activity as tolerated     Follow Up    Follow up with [facility] Department of Pulmonary Medicine after 3 months . for hospital follow- up. The following labs/tests are recommended: Pulm function testing and Ct chest to be performed prior to appt .     General PFT Lab (Please always keep checked)     Pulmonary Function Test    .     Diet    Follow this diet upon discharge: Current Diet:Orders Placed This Encounter      Regular Diet Adult       Significant Results and Procedures   Most Recent 3 CBC's:  Recent Labs   Lab Test 07/07/25  1220 07/07/25  0609 07/07/25  0038 07/06/25  1758 07/06/25  0621 07/05/25  2121   WBC  --  9.3  --   --  10.6 11.4*   HGB 14.5 13.6  13.6 13.7   < > 13.2* 14.7   MCV 87 86  86 86   < > 86 88   PLT  --  209  --   --  193 220    < > = values in this interval not displayed.     Most Recent 3 BMP's:  Recent Labs   Lab Test 07/07/25  0609 07/06/25  0621 07/05/25  2121    139 142   POTASSIUM 4.2 4.1 4.9   CHLORIDE 104 107 105   CO2 23 21* 26   BUN 17.0 17.1 21.7   CR 0.85 0.91 1.11   ANIONGAP 10 11 11   TOI 9.2 8.7* 9.2   * 106* 106*     Most Recent ESR & CRP:  Recent Labs   Lab Test 07/07/25  0609   CRPI 33.31*   ,   Results for orders placed or performed during the hospital encounter of 07/05/25   XR Chest 2 Views    Narrative    EXAM: XR CHEST 2 VIEWS  LOCATION: [facility]  DATE: 7/5/2025    INDICATION: cough blood tinged  COMPARISON:  9/21/2024      Impression    IMPRESSION: New hazy airspace opacities throughout left lung suspicious for pneumonia in the setting of cough. Clear right lung. No pleural effusion. Normal heart size and pulmonary vascularity. Aortic calcifications.   CT Chest Pulmonary Embolism w Contrast    Narrative    EXAM: CT CHEST PULMONARY EMBOLISM W CONTRAST  LOCATION: [facility]  DATE: 7/5/2025    INDICATION: hemoptysis, elevated ddimer  COMPARISON: Cardiac CT 10/1/2019  TECHNIQUE: CT chest pulmonary angiogram during arterial phase injection of IV contrast. Multiplanar reformats and MIP reconstructions were performed. Dose reduction techniques were used.   CONTRAST: 85mL Isovue 370    FINDINGS:  ANGIOGRAM CHEST: Pulmonary arteries are normal caliber and negative for pulmonary emboli. Thoracic aorta is negative for dissection. No CT evidence of right heart strain.    LUNGS AND PLEURA: There is new extensive somewhat geographic groundglass opacity involving much of left upper lobe as well as mild groundglass opacities involving lower lobes posteriorly. The findings are nonspecific and could certainly relate to the   patient's reported hemoptysis though atypical pneumonia or acute alveolitis related to underlying chronic interstitial lung disease also possible. There is a mild reticular peripheral interstitial pattern suggesting potential underlying pulmonary   fibrosis. No pleural effusion.    MEDIASTINUM/AXILLAE: A few minimally prominent lymph nodes present likely reactive.    CORONARY ARTERY CALCIFICATION: None.    UPPER ABDOMEN: Cholecystectomy. Small hepatic cyst.    MUSCULOSKELETAL: Normal.      Impression    IMPRESSION:  1.  Groundglass opacities predominantly involving left upper lobe are nonspecific but could relate to alveolar hemorrhage in a patient with hemoptysis. Atypical pneumonia or even acute cellulitis relating to chronic interstitial lung disease are other   considerations.  2.  There  does appear to be underlying reticular interstitial prominence suggesting mild chronic fibrosis.       Discharge Medications      Review of your medicines        START taking        Dose / Directions   predniSONE 20 MG tablet  Commonly known as: DELTASONE      Take 2 tabs by mouth daily x 5 days, then 1.5 tabs daily x 3 days, then 1 tab daily x 3 days, then 1/2 tab daily x 3 days.  Quantity: 20 tablet  Refills: 0            CONTINUE these medicines which have NOT CHANGED        Dose / Directions   acetaminophen 500 MG tablet  Commonly known as: TYLENOL      Dose: 500-1,000 mg  Take 500-1,000 mg by mouth every 6 hours as needed for mild pain.  Refills: 0     albuterol 108 (90 Base) MCG/ACT inhaler  Commonly known as: PROAIR HFA/PROVENTIL HFA/VENTOLIN HFA  Used for: Cough      Dose: 2 puff  Inhale 2 puffs into the lungs every 6 hours.  Quantity: 1 each  Refills: 0     cetirizine 10 MG tablet  Commonly known as: zyrTEC      Dose: 10 mg  Take 10 mg by mouth daily.  Refills: 0     flunisolide 25 MCG/ACT (0.025%) Soln spray  Commonly known as: NASALIDE      Dose: 2 spray  Spray 2 sprays into both nostrils daily as needed  Refills: 0     fluticasone 50 MCG/ACT nasal spray  Commonly known as: FLONASE      Dose: 1 spray  Spray 1 spray into both nostrils daily.  Refills: 0     ibuprofen 400 MG tablet  Commonly known as: ADVIL/MOTRIN      Dose: 400 mg  Take 400 mg by mouth every 6 hours as needed for moderate pain.  Refills: 0     LEVOTHYROXINE SODIUM PO      Dose: 100 mcg  Take 100 mcg by mouth daily.  Refills: 0     tadalafil 20 MG tablet  Commonly known as: CIALIS      Dose: 20 mg  Take 20 mg by mouth daily as needed.  Refills: 0     VIAGRA PO      Dose: 25 mg  Take 25 mg by mouth daily as needed  Refills: 0               Where to get your medicines        These medications were sent to [place], [place] - [address] [facility]  [address] [facility] [address]      Phone:  [phone]   predniSONE 20 MG tablet       Allergies   Allergies   Allergen Reactions    Amoxicillin-Pot Clavulanate     Erythromycin Diarrhea and GI Disturbance    Tobacco     Trees      Red Maple    Weed [Grass]      Grass and Weeds

## 2025-07-07 NOTE — PLAN OF CARE
"Goal Outcome Evaluation:      Plan of Care Reviewed With: patient    Overall Patient Progress: improvingOverall Patient Progress: improving    Outcome Evaluation: Pt is alert and oriented x4. Denied pain. VSS and using home CPAP overnight. Denies any bloody secretions. Monitoring Hgb 6hrs and CRP. Pulmonology following.  POC ongoing    Blood pressure (!) 150/71, pulse 58, temperature 97.8  F (36.6  C), temperature source Oral, resp. rate 16, height 1.829 m (6'), weight 108.5 kg (239 lb 1.6 oz), SpO2 93%.       Problem: Adult Inpatient Plan of Care  Goal: Plan of Care Review  Description: The Plan of Care Review/Shift note should be completed every shift.  The Outcome Evaluation is a brief statement about your assessment that the patient is improving, declining, or no change.  This information will be displayed automatically on your shift  note.  Outcome: Progressing  Flowsheets (Taken 7/7/2025 5487)  Outcome Evaluation: Pt is alert and oriented x4. Denied pain. VSS and using home CPAP overnight. Denies any bloody secretions. Monitoring Hgb 6hrs and CRP. Pulmonology following.  POC ongoing  Plan of Care Reviewed With: patient  Overall Patient Progress: improving  Goal: Patient-Specific Goal (Individualized)  Description: You can add care plan individualizations to a care plan. Examples of Individualization might be:  \"Parent requests to be called daily at 9am for status\", \"I have a hard time hearing out of my right ear\", or \"Do not touch me to wake me up as it startles  me\".  Outcome: Progressing  Goal: Absence of Hospital-Acquired Illness or Injury  Outcome: Progressing  Goal: Optimal Comfort and Wellbeing  Outcome: Progressing  Goal: Readiness for Transition of Care  Outcome: Progressing     Problem: Delirium  Goal: Optimal Coping  Outcome: Progressing  Goal: Improved Behavioral Control  Outcome: Progressing  Goal: Improved Attention and Thought Clarity  Outcome: Progressing  Goal: Improved Sleep  Outcome: " Progressing

## 2025-07-07 NOTE — PLAN OF CARE
"Care From: 1930 - 2300  Patient is A & O x 4, LS CTA, BS active, Denies; pain, SOB, NV, or hemoptysis. Pt is independent in room. Plan is to continue with the Tranexamic nebulizer (RT) q8hrs and solumedrol IV q6hrs. RT & Pulmonology following.    BP (!) 155/85 (BP Location: Right arm)   Pulse 72   Temp 99  F (37.2  C) (Oral)   Resp 16   Ht 1.829 m (6')   Wt 108.5 kg (239 lb 1.6 oz)   SpO2 96%   BMI 32.43 kg/m       Problem: Adult Inpatient Plan of Care  Goal: Plan of Care Review  Description: The Plan of Care Review/Shift note should be completed every shift.  The Outcome Evaluation is a brief statement about your assessment that the patient is improving, declining, or no change.  This information will be displayed automatically on your shift  note.  Outcome: Progressing  Goal: Patient-Specific Goal (Individualized)  Description: You can add care plan individualizations to a care plan. Examples of Individualization might be:  \"Parent requests to be called daily at 9am for status\", \"I have a hard time hearing out of my right ear\", or \"Do not touch me to wake me up as it startles  me\".  Outcome: Progressing  Goal: Absence of Hospital-Acquired Illness or Injury  Outcome: Progressing  Goal: Optimal Comfort and Wellbeing  Outcome: Progressing  Goal: Readiness for Transition of Care  Outcome: Progressing     Problem: Delirium  Goal: Optimal Coping  Outcome: Progressing  Goal: Improved Behavioral Control  Outcome: Progressing  Goal: Improved Attention and Thought Clarity  Outcome: Progressing  Goal: Improved Sleep  Outcome: Progressing  "

## 2025-07-08 ENCOUNTER — TELEPHONE (OUTPATIENT)
Dept: PULMONOLOGY | Facility: CLINIC | Age: 80
End: 2025-07-08
Payer: COMMERCIAL

## 2025-07-08 ENCOUNTER — PATIENT OUTREACH (OUTPATIENT)
Dept: CARE COORDINATION | Facility: CLINIC | Age: 80
End: 2025-07-08
Payer: COMMERCIAL

## 2025-07-08 LAB
ALDOLASE SERPL-CCNC: 4.6 U/L
BACTERIA BRONCH: NORMAL
CCP AB SER IA-ACNC: 1.2 U/ML
ENA JO1 AB SER IA-ACNC: <0.5 U/ML
ENA JO1 IGG SER-ACNC: NEGATIVE
ENA SCL70 IGG SER IA-ACNC: <0.6 U/ML
ENA SCL70 IGG SER IA-ACNC: NEGATIVE
ENA SS-A AB SER IA-ACNC: <0.5 U/ML
ENA SS-A AB SER IA-ACNC: NEGATIVE
ENA SS-B IGG SER IA-ACNC: <0.6 U/ML
ENA SS-B IGG SER IA-ACNC: NEGATIVE
GALACTOMANNAN AG BAL QL: POSITIVE
GALACTOMANNAN AG SPEC IA-ACNC: 1.35
L PNEUMO DNA SPEC QL NAA+PROBE: NOT DETECTED
LEGIONELLA DNA SPEC NAA+PROBE: NOT DETECTED
M PNEUMO DNA SPEC QL NAA+PROBE: NOT DETECTED
MYELOPEROXIDASE AB SER IA-ACNC: <0.3 U/ML
MYELOPEROXIDASE AB SER IA-ACNC: NEGATIVE
PROTEINASE3 AB SER IA-ACNC: <1 U/ML
PROTEINASE3 AB SER IA-ACNC: NEGATIVE

## 2025-07-08 NOTE — TELEPHONE ENCOUNTER
Reviewed pulmonary consult note from Dr Cerda (7/7/25)      Ok to be seen on scheduled date of 10/31/25.    Bradley Hawkins RN

## 2025-07-08 NOTE — TELEPHONE ENCOUNTER
Left Voicemail (1st Attempt) for the patient to call back and schedule the following:    Appointment type: rpulm  Provider: na  Return date: next avail  Specialty phone number: [phone]  Additional appointment(s) needed: full pft  Additonal Notes: per review gen pulm next avail

## 2025-07-08 NOTE — PROGRESS NOTES
[facility]: Transitions of Care Outreach  Chief Complaint   Patient presents with    Clinic Care Coordination - Post Hospital       Most Recent Admission Date: 7/5/2025   Most Recent Admission Diagnosis: Hemoptysis - R04.2     Most Recent Discharge Date: 7/7/2025   Most Recent Discharge Diagnosis: Hemoptysis - R04.2     Transitions of Care Assessment    Discharge Assessment  How are you doing now that you are home?: I am doing fine. I am happy to be at home.  How are your symptoms? (Red Flag symptoms escalate to triage hotline per guidelines): Improved  Do you know how to contact your clinic care team if you have future questions or changes to your health status? : Yes  Does the patient have their discharge instructions? : Yes  Does the patient have questions regarding their discharge instructions? : No  Were you started on any new medications or were there changes to any of your previous medications? : Yes  Does the patient have all of their medications?: Yes  Do you have questions regarding any of your medications? : No    Post-op ([facility] CTA Only)  If the patient had a surgery or procedure, do they have any questions for a nurse?: No             Follow up Plan     Discharge Follow-Up  Discharge follow up appointment scheduled in alignment with recommended follow up timeframe or Transitions of Risk Category? (Low = within 30 days; Moderate= within 14 days; High= within 7 days): Yes  Discharge Follow Up Appointment Date: 10/31/25  Discharge Follow Up Appointment Scheduled with?: Specialty Care Provider    Future Appointments   Date Time Provider Department Center   10/31/2025  9:00 AM EICCT1 JARON Schneider IMG   10/31/2025 10:30 AM CS PULMONARY FUNCTION CSPULM CS   10/31/2025 11:30 AM Jaime Cerda MD CSPUL CS       Outpatient Plan as outlined on AVS reviewed with patient.    For any urgent concerns, please contact our 24 hour nurse triage line: [phone] ([phone])        Nunu TARSHA  [phone]  [facility]

## 2025-07-08 NOTE — TELEPHONE ENCOUNTER
Patient is returning a call to schedule an appt. Patient states he was seen at the [facility] and was told to schedule a 3 months out with Pulmonary. Writer seen the note to schedule next available on encounter below along with the referral. Patient is wanting to verify when he is needing to come into the clinic. Patient would like to get a call back to further discuss. Please advise.       Writer did schedule the appt for 10/31/2025 for both the PFT and appt with provider. Please review

## 2025-07-09 LAB
PATH REPORT.COMMENTS IMP SPEC: NORMAL
PATH REPORT.COMMENTS IMP SPEC: NORMAL
PATH REPORT.FINAL DX SPEC: NORMAL
PATH REPORT.GROSS SPEC: NORMAL
PATH REPORT.MICROSCOPIC SPEC OTHER STN: NORMAL
PATH REPORT.RELEVANT HX SPEC: NORMAL

## 2025-07-09 PROCEDURE — 88313 SPECIAL STAINS GROUP 2: CPT | Mod: 26 | Performed by: PATHOLOGY

## 2025-07-09 PROCEDURE — 88108 CYTOPATH CONCENTRATE TECH: CPT | Mod: 26 | Performed by: PATHOLOGY

## 2025-07-09 PROCEDURE — 88305 TISSUE EXAM BY PATHOLOGIST: CPT | Mod: 26 | Performed by: PATHOLOGY

## 2025-07-10 LAB
BACTERIA BRONCH: NORMAL
BACTERIA BRONCH: NORMAL
SCANNED LAB RESULT: NORMAL

## 2025-07-11 LAB
A FLAVUS AB SER QL ID: NORMAL
A FUMIGATUS1 AB SER QL ID: NORMAL
A FUMIGATUS2 AB SER QL ID: NORMAL
A FUMIGATUS3 AB SER QL ID: NORMAL
A FUMIGATUS6 AB SER QL ID: NORMAL
A PULLULANS AB SER QL ID: NORMAL
ANA SER QL IF: NEGATIVE
ANCA AB PATTERN SER IF-IMP: NORMAL
C-ANCA TITR SER IF: NORMAL {TITER}
P JIROVECII DNA SPEC QL NAA+PROBE: NOT DETECTED
PIGEON SERUM AB QL ID: NORMAL
S RECTIVIRGULA AB SER QL ID: NORMAL
S VIRIDIS AB SER QL ID: NORMAL
T CANDIDUS AB SER QL: NORMAL

## 2025-07-14 ENCOUNTER — TELEPHONE (OUTPATIENT)
Dept: PULMONOLOGY | Facility: CLINIC | Age: 80
End: 2025-07-14
Payer: COMMERCIAL

## 2025-07-14 NOTE — TELEPHONE ENCOUNTER
RN called and spoke with patient. Urinary adverse effects not noted under micromedex. Recommended patient follow-up with PCP regarding urinary symptoms. Patient states if symptoms persist he will follow-up with his PCP.    Bradley Hawkins RN

## 2025-07-14 NOTE — TELEPHONE ENCOUNTER
[facility] Call Center    Phone Message    May a detailed message be left on voicemail: yes     Reason for Call: Symptoms or Concerns     If patient has red-flag symptoms, warm transfer to triage line    Current symptom or concern: Possible side effects from Prednisone RX patient starting experience problems urinating on Saturday. Slow stream not renny to empty bladder.     Symptoms have been present for:  2 day(s)    Has patient previously been seen for this? No      Action Taken: Other: PULM     Travel Screening: Not Applicable     Date of Service:

## 2025-07-17 LAB
BACTERIA BRONCH: NORMAL
BACTERIA BRONCH: NORMAL

## 2025-07-20 LAB — BACTERIA BRONCH: NORMAL

## 2025-07-21 ENCOUNTER — HOSPITAL ENCOUNTER (EMERGENCY)
Facility: CLINIC | Age: 80
Discharge: HOME | End: 2025-07-21
Attending: EMERGENCY MEDICINE
Payer: COMMERCIAL

## 2025-07-21 VITALS
OXYGEN SATURATION: 98 % | TEMPERATURE: 98.5 F | HEART RATE: 66 BPM | SYSTOLIC BLOOD PRESSURE: 176 MMHG | WEIGHT: 236 LBS | BODY MASS INDEX: 32.01 KG/M2 | DIASTOLIC BLOOD PRESSURE: 117 MMHG | RESPIRATION RATE: 16 BRPM

## 2025-07-21 DIAGNOSIS — J01.90 ACUTE SINUSITIS, RECURRENCE NOT SPECIFIED, UNSPECIFIED LOCATION: ICD-10-CM

## 2025-07-21 DIAGNOSIS — R35.0 URINARY FREQUENCY: ICD-10-CM

## 2025-07-21 DIAGNOSIS — I10 HYPERTENSION, UNSPECIFIED TYPE: ICD-10-CM

## 2025-07-21 LAB
ALBUMIN UR-MCNC: NEGATIVE MG/DL
ANION GAP SERPL CALCULATED.3IONS-SCNC: 11 MMOL/L (ref 7–15)
APPEARANCE UR: CLEAR
ATRIAL RATE - MUSE: 76 BPM
BILIRUB UR QL STRIP: NEGATIVE
BUN SERPL-MCNC: 18.1 MG/DL (ref 8–23)
CALCIUM SERPL-MCNC: 9.2 MG/DL (ref 8.8–10.4)
CHLORIDE SERPL-SCNC: 100 MMOL/L (ref 98–107)
COLOR UR AUTO: ABNORMAL
CREAT SERPL-MCNC: 0.97 MG/DL (ref 0.67–1.17)
DIASTOLIC BLOOD PRESSURE - MUSE: NORMAL MMHG
EGFRCR SERPLBLD CKD-EPI 2021: 80 ML/MIN/1.73M2
GLUCOSE SERPL-MCNC: 111 MG/DL (ref 70–99)
GLUCOSE UR STRIP-MCNC: NEGATIVE MG/DL
HCO3 SERPL-SCNC: 28 MMOL/L (ref 22–29)
HGB UR QL STRIP: NEGATIVE
INTERPRETATION ECG - MUSE: NORMAL
KETONES UR STRIP-MCNC: NEGATIVE MG/DL
LEUKOCYTE ESTERASE UR QL STRIP: NEGATIVE
NITRATE UR QL: NEGATIVE
P AXIS - MUSE: 60 DEGREES
PH UR STRIP: 7.5 [PH] (ref 5–7)
POTASSIUM SERPL-SCNC: 4.7 MMOL/L (ref 3.4–5.3)
PR INTERVAL - MUSE: 154 MS
QRS DURATION - MUSE: 104 MS
QT - MUSE: 376 MS
QTC - MUSE: 423 MS
R AXIS - MUSE: -65 DEGREES
RBC URINE: 1 /HPF
SODIUM SERPL-SCNC: 139 MMOL/L (ref 135–145)
SP GR UR STRIP: 1 (ref 1–1.03)
SYSTOLIC BLOOD PRESSURE - MUSE: NORMAL MMHG
T AXIS - MUSE: 65 DEGREES
TROPONIN T SERPL HS-MCNC: 15 NG/L
TROPONIN T SERPL HS-MCNC: 17 NG/L
UROBILINOGEN UR STRIP-MCNC: NORMAL MG/DL
VENTRICULAR RATE- MUSE: 76 BPM
WBC URINE: 0 /HPF

## 2025-07-21 PROCEDURE — 99284 EMERGENCY DEPT VISIT MOD MDM: CPT | Performed by: EMERGENCY MEDICINE

## 2025-07-21 PROCEDURE — 93005 ELECTROCARDIOGRAM TRACING: CPT

## 2025-07-21 PROCEDURE — 36415 COLL VENOUS BLD VENIPUNCTURE: CPT | Performed by: EMERGENCY MEDICINE

## 2025-07-21 PROCEDURE — 84484 ASSAY OF TROPONIN QUANT: CPT | Performed by: EMERGENCY MEDICINE

## 2025-07-21 PROCEDURE — 81003 URINALYSIS AUTO W/O SCOPE: CPT | Performed by: EMERGENCY MEDICINE

## 2025-07-21 PROCEDURE — 250N000013 HC RX MED GY IP 250 OP 250 PS 637: Performed by: EMERGENCY MEDICINE

## 2025-07-21 PROCEDURE — 82310 ASSAY OF CALCIUM: CPT | Performed by: EMERGENCY MEDICINE

## 2025-07-21 RX ORDER — LISINOPRIL 10 MG/1
10 TABLET ORAL DAILY
Qty: 30 TABLET | Refills: 0 | Status: SHIPPED | OUTPATIENT
Start: 2025-07-21 | End: 2025-07-30

## 2025-07-21 RX ORDER — TAMSULOSIN HYDROCHLORIDE 0.4 MG/1
0.4 CAPSULE ORAL DAILY
Qty: 10 CAPSULE | Refills: 0 | Status: SHIPPED | OUTPATIENT
Start: 2025-07-21 | End: 2025-07-21

## 2025-07-21 RX ORDER — TAMSULOSIN HYDROCHLORIDE 0.4 MG/1
0.4 CAPSULE ORAL DAILY
Qty: 10 CAPSULE | Refills: 0 | Status: SHIPPED | OUTPATIENT
Start: 2025-07-21

## 2025-07-21 RX ADMIN — AMOXICILLIN AND CLAVULANATE POTASSIUM 1 TABLET: 875; 125 TABLET, FILM COATED ORAL at 14:15

## 2025-07-21 ASSESSMENT — COLUMBIA-SUICIDE SEVERITY RATING SCALE - C-SSRS
1. IN THE PAST MONTH, HAVE YOU WISHED YOU WERE DEAD OR WISHED YOU COULD GO TO SLEEP AND NOT WAKE UP?: NO
6. HAVE YOU EVER DONE ANYTHING, STARTED TO DO ANYTHING, OR PREPARED TO DO ANYTHING TO END YOUR LIFE?: NO
2. HAVE YOU ACTUALLY HAD ANY THOUGHTS OF KILLING YOURSELF IN THE PAST MONTH?: NO

## 2025-07-21 ASSESSMENT — ACTIVITIES OF DAILY LIVING (ADL)
ADLS_ACUITY_SCORE: 56

## 2025-07-21 NOTE — ED TRIAGE NOTES
Pt arrives with nasal congestion/pressure and headache that started several weeks ago, pt states pain woek him up last night. Took tylenol at 0800. Pt was sent over from  due to hypertension, denies any hx.      Triage Assessment (Adult)       Row Name 07/21/25 1207          Triage Assessment    Airway WDL WDL        Respiratory WDL    Respiratory WDL WDL

## 2025-07-21 NOTE — DISCHARGE INSTRUCTIONS
I recommend taking probiotics with the Augmentin, this can help decrease the likelihood of gastrointestinal upset.  As we discussed, your symptoms of urinary frequency could be related to your prostate becoming more large.  I am starting a medication today called Flomax or tamsulosin which helps to shrink the prostate.  However, I recommend following up with your primary care doctor given that I have not made a definitive diagnosis of prostatic hypertrophy.  I also make it recommend talking to your doctor about your elevated blood pressure.  It appears that there have been times your blood pressure has been elevated in the past, I suspect you need to be started on medication.

## 2025-07-21 NOTE — ED PROVIDER NOTES
"  Emergency Department Note      History of Present Illness     Chief Complaint   Headache and Sinusitis      HPI   Denilson Santana is a 80 year old male with history of hypothyroidism, hypertension, hyperlipidemia and hypercholesteremia who presents to the ED for evaluation of a headache and sinusitis. The patient reports that he has had ongoing problems for the past 4-5 weeks. He states that originally he had a sinus infection and was seen at the ED, but was sent home with no medications. Shortly after, on July 5th he reports that he had 3 episodes of coughing up blood and was admitted into the hospital for observation. Over the last few days he reports that he has been dealing with nasal/sinus congestion and a headache. He states that the pain from the congestion and headache radiates across his entire forehead and into his cheeks and nose. He reports that due the pain/discomfort he has been unable to sleep for more then a few hours at night and feels very fatigued/weak. Today, he states that he went to Urgent Care who took his blood pressure and noticed hat it was abnormally high for him leading to his arrival at the ED. He notes that his usually blood pressure is around 130/86 at its peak and does not generally change. He denies a history of hypertension. He states that at this time he also is concerned that his prostate may be swollen as he has had an inability to empty his bladder fully. He reports that his \"stream is slower then usual\". He denies any pain during urination. He endorses a mild dry/productive cough, adding that he is no longer coughing up blood but occasionally brings up phlegm. He reports that he has been taking Prednisone for the past 2 weeks as prescribed from his discharge from the hospital. Last dose was taken this morning. He adds that he cut out coffee from his diet while taking the Prednisone, but as of today he had 3 cups off coffee. He reports that he has been unable to see an " ENT specialist yet regarding the continued congestion/sinus pressure.     Independent Historian   None    Review of External Notes   I reviewed admission note from 7/5/2025 for hemoptysis.  Patient was referred to pulmonary medicine for further evaluation.  They thought symptoms could be related to hypersensitivity pneumonitis from bird exposure.    Past Medical History     Medical History and Problem List   Actinic keratosis  Allergic state  Bronchitis  CPAP (continuous positive airway pressure) dependence  Heart murmur  Hypercholesterolemia  Hyperlipidemia  Lumbago  Sleep apnea  Cancer   Obesity   Cocaine abuse   Thyroid disease  Cannabis abuse   Chronic rhinitis   Colonic polyp   Hypertension   ED   Impaired fasting glucose   Rheumatic fever   Senile hyperkeratosis   Tinnitus   Atopic xeroderma   Xerosis   SCC    Medications   Albuterol   Zyrtec   Synthroid   Deltasone   Cialis   Vibramycin   Prednisone    Surgical History   Ankle surgery   Bronchoscopy   Cholecystectomy   Tonsillectomy and adenoidectomy   Excise lesion neck   Herniorrhaphy inguinal    Cyst removal     Physical Exam     Patient Vitals for the past 24 hrs:   BP Temp Pulse Resp SpO2 Weight   07/21/25 1212 (!) 195/130 98.5  F (36.9  C) 79 18 100 % 107 kg (236 lb)     Physical Exam  General: alert, seated comfortably in room 40.  HENT: mucous membranes moist.  Nose: no active discharge, mild inflammation in nasal mucosa.  Tenderness to percussion over the frontal and maxillary sinuses.  Neck: FROM without pain.  CV: regular rate, regular rhythm  Resp: normal effort, clear throughout, no crackles or wheezing  GI: abdomen soft and nontender, no guarding  MSK: no bony tenderness  Skin: appropriately warm and dry  Extremities: no edema, calves non-tender  Neuro:   Speech is fluent, cognition is normal.     EOMI, symmetric grimace.     Str 5/5 in RUE, LUE, RLE, LLE.     Fine touch sensation intact in BUE/BLE.  Psych: normal mood and  affect      Diagnostics     Lab Results   Labs Ordered and Resulted from Time of ED Arrival to Time of ED Departure   BASIC METABOLIC PANEL - Abnormal       Result Value    Sodium 139      Potassium 4.7      Chloride 100      Carbon Dioxide (CO2) 28      Anion Gap 11      Urea Nitrogen 18.1      Creatinine 0.97      GFR Estimate 80      Calcium 9.2      Glucose 111 (*)    ROUTINE UA WITH MICROSCOPIC REFLEX TO CULTURE - Abnormal    Color Urine Straw      Appearance Urine Clear      Glucose Urine Negative      Bilirubin Urine Negative      Ketones Urine Negative      Specific Gravity Urine 1.005      Blood Urine Negative      pH Urine 7.5 (*)     Protein Albumin Urine Negative      Urobilinogen Urine Normal      Nitrite Urine Negative      Leukocyte Esterase Urine Negative      RBC Urine 1      WBC Urine 0     TROPONIN T, HIGH SENSITIVITY - Normal    Troponin T, High Sensitivity 17     TROPONIN T, HIGH SENSITIVITY - Normal    Troponin T, High Sensitivity 15         Imaging   No orders to display       EKG   ECG taken at 1319, ECG read at 1426  Normal sinus rhythm    Left anterior fascicular block   Minimal voltage criteria for LVH, may be normal variant ( [place] product)   No significant change as compared to prior, dated 7/5/25.  Rate 76 bpm. RI interval 154 ms. QRS duration 104 ms. QT/QTc 376/423 ms. P-R-T axes 60 -65 65.     Independent Interpretation   None    ED Course      Medications Administered   Medications   amoxicillin-clavulanate (AUGMENTIN) 875-125 MG per tablet 1 tablet (1 tablet Oral $Given 7/21/25 1415)       Procedures   Procedures     Discussion of Management   None    ED Course   ED Course as of 07/21/25 1655   Mon Jul 21, 2025   1330 I obtained history and examined the patient as noted above.    1352 I rechecked, obtained and examined the patient after he went to the restroom.    1401 Patient has an allergy listed to Augmentin, it appears he has a history of gastrointestinal diet.        Additional Documentation  None    Medical Decision Making / Diagnosis     CMS Diagnoses: None    MIPS   None        [facility]   Denilson Santana is a 80 year old male with a history of hemoptysis, recurrent sinus infections, hypertension, presenting today with hypertension in the setting of facial pressure.  On exam, the patient is nontoxic without any signs or symptoms to suggest meningitis or cerebral empyema.  I do not think advanced imaging is indicated at this point.  Given ongoing symptoms, I did write the patient a prescription for Augmentin for recurrent sinusitis.  I recommended follow-up as an outpatient with ENT given recurrent symptoms.  In addition, the patient complains of urinary frequency.  Your UA is negative for signs of infection.  I suspect he has evolving prostatic hypertrophy, and is started him on tamsulosin for this.  I recommended outpatient follow-up with PCP for definitive diagnosis.  Finally, the patient was hypertensive in the ED on several rechecks.  He is not currently on medication for hypertension.  He is not aware of previous documentation of elevated blood pressures, but review of records indicates that he has been hypertensive on multiple previous visits.  I do recommend starting lisinopril given very elevated diastolic pressure.  There is no evidence for endorgan damage today, with normal creatinine, normal neuroexam, and no signs of cardiac ischemia.  He understands that he should follow-up with PCP in the next 3 to 5 days.  Return to the ED at any point for worsening symptoms.    Disposition   The patient was discharged.     Diagnosis     ICD-10-CM    1. Acute sinusitis, recurrence not specified, unspecified location  J01.90 ED To Primary Care Referral      2. Hypertension, unspecified type  I10 ED To Primary Care Referral      3. Urinary frequency  R35.0              Scribe Disclosure:  Deni MEJIAS, am serving as a scribe at 1:57 PM on 7/21/2025 to document services  personally performed by Mary Henderson MD based on my observations and the provider's statements to me.        Mary Henderson MD  07/21/25 7429

## 2025-07-24 LAB
BACTERIA BRONCH: NORMAL
BACTERIA BRONCH: NORMAL

## 2025-07-27 ENCOUNTER — HEALTH MAINTENANCE LETTER (OUTPATIENT)
Age: 80
End: 2025-07-27

## 2025-07-30 ENCOUNTER — OFFICE VISIT (OUTPATIENT)
Dept: FAMILY MEDICINE | Facility: CLINIC | Age: 80
End: 2025-07-30

## 2025-07-30 VITALS
WEIGHT: 231 LBS | SYSTOLIC BLOOD PRESSURE: 147 MMHG | OXYGEN SATURATION: 99 % | BODY MASS INDEX: 31.33 KG/M2 | DIASTOLIC BLOOD PRESSURE: 85 MMHG | HEART RATE: 56 BPM

## 2025-07-30 DIAGNOSIS — R91.8 GROUND GLASS OPACITY PRESENT ON IMAGING OF LUNG: Primary | ICD-10-CM

## 2025-07-30 DIAGNOSIS — J34.89 SINUS PRESSURE: ICD-10-CM

## 2025-07-30 DIAGNOSIS — I10 PRIMARY HYPERTENSION: ICD-10-CM

## 2025-07-30 DIAGNOSIS — J84.112 IPF (IDIOPATHIC PULMONARY FIBROSIS) (H): ICD-10-CM

## 2025-07-30 LAB
ANION GAP SERPL CALCULATED.3IONS-SCNC: 11 MMOL/L (ref 7–15)
BUN SERPL-MCNC: 25.5 MG/DL (ref 8–23)
CALCIUM SERPL-MCNC: 9.3 MG/DL (ref 8.8–10.4)
CHLORIDE SERPL-SCNC: 100 MMOL/L (ref 98–107)
CREAT SERPL-MCNC: 1 MG/DL (ref 0.67–1.17)
EGFRCR SERPLBLD CKD-EPI 2021: 77 ML/MIN/1.73M2
FASTING STATUS PATIENT QL REPORTED: NO
GLUCOSE SERPL-MCNC: 96 MG/DL (ref 70–99)
HCO3 SERPL-SCNC: 27 MMOL/L (ref 22–29)
POTASSIUM SERPL-SCNC: 4.5 MMOL/L (ref 3.4–5.3)
SODIUM SERPL-SCNC: 138 MMOL/L (ref 135–145)

## 2025-07-30 PROCEDURE — 80048 BASIC METABOLIC PNL TOTAL CA: CPT | Mod: ORL | Performed by: FAMILY MEDICINE

## 2025-07-30 PROCEDURE — 36415 COLL VENOUS BLD VENIPUNCTURE: CPT | Performed by: FAMILY MEDICINE

## 2025-07-30 PROCEDURE — 99205 OFFICE O/P NEW HI 60 MIN: CPT | Performed by: FAMILY MEDICINE

## 2025-07-30 PROCEDURE — G2211 COMPLEX E/M VISIT ADD ON: HCPCS | Performed by: FAMILY MEDICINE

## 2025-07-30 PROCEDURE — 3077F SYST BP >= 140 MM HG: CPT | Performed by: FAMILY MEDICINE

## 2025-07-30 PROCEDURE — 3079F DIAST BP 80-89 MM HG: CPT | Performed by: FAMILY MEDICINE

## 2025-07-30 RX ORDER — LISINOPRIL 10 MG/1
10 TABLET ORAL DAILY
Qty: 90 TABLET | Refills: 0 | Status: SHIPPED | OUTPATIENT
Start: 2025-07-30

## 2025-07-30 NOTE — PROGRESS NOTES
Answers submitted by the patient for this visit:  Hypertension Visit (Submitted on 7/29/2025)  Chief Complaint: Chronic problems general questions HPI Form  Do you check your blood pressure regularly outside of the clinic?: Yes  Are your blood pressures ever more than 140 on the top number (systolic) OR more than 90 on the bottom number (diastolic)? (For example, greater than 140/90): Yes  Are you following a low salt diet?: No  General Questionnaire (Submitted on 7/29/2025)  Chief Complaint: Chronic problems general questions HPI Form  How many servings of fruits and vegetables do you eat daily?: 2-3  On average, how many sweetened beverages do you drink each day (Examples: soda, juice, sweet tea, etc.  Do NOT count diet or artificially sweetened beverages)?: 1  How many minutes a day do you exercise enough to make your heart beat faster?: 10 to 19  How many days a week do you exercise enough to make your heart beat faster?: 3 or less  How many days per week do you miss taking your medication?: 0  Questionnaire about: Chronic problems general questions HPI Form (Submitted on 7/29/2025)  Chief Complaint: Chronic problems general questions HPI Form      Subjective     Denilson is a 80 year old patient who presents to clinic to establish care.  He was admitted earlier this month after having episodes of hemoptysis.  He underwent an extensive work up including consultation with pulmonology, imaging and bronchoscopy.  The symptoms have resolved but the cause remains uncertain.  CT showed:    IMPRESSION:  1.  Groundglass opacities predominantly involving left upper lobe are nonspecific but could relate to alveolar hemorrhage in a patient with hemoptysis. Atypical pneumonia or even acute cellulitis relating to chronic interstitial lung disease are other   considerations.  2.  There does appear to be underlying reticular interstitial prominence suggesting mild chronic fibrosis.    Extensive BAL and laboratory testing was  mostly unrevealing.  However, I did note that Aspergillus Galactomannan was positive on BAL.  The patient was unaware of this and it is unclear if this is of clinical significance.  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  Also, he spent extensive time in the past in [place] and handled their birds at very close range.  He denies chest pain, SOB.  He notes chronic sinus pressure and this does improve with antibiotics.  Prior CT Sinuses was without obvious pathology.    Finally, after hospitalization his BP spiked for unclear reasons.  He was started on lisinopril and it did improve.  He denies any symptoms.  He had no history of HTN.    Review of Systems   Constitutional, HEENT, cardiovascular, pulmonary, GI, , musculoskeletal, neuro, skin, endocrine and psych systems are negative, except as otherwise noted.      Objective    BP (!) 147/85 (BP Location: Right arm)   Pulse 56   Wt 104.8 kg (231 lb)   SpO2 99%   BMI 31.33 kg/m      General: Well appearing, NAD  HEENT: Clear  Heart: RRR, no murmur  Chest: Lungs CTAB  Skin: Clear  MSK: no edema  Psych: normal mood and affect        No results found for this or any previous visit (from the past 24 hours).    Ground glass opacity present on imaging of lung  Uncertain etiology.  Pulm follow up is arranged with repeat CT  Encouraged ID eval given uncertain significance of Aspergillus BAL finding  - Adult Infectious Disease  Referral - To a [facility] Location (Use POS/Location); Future  - VENOUS COLLECTION    IPF (idiopathic pulmonary fibrosis) (H)  Apparent mild underlying fibrosis on CT  Continued pulm follow up  - VENOUS COLLECTION    Sinus pressure  Recommend ENT consultation  - Adult ENT  Referral - To a [facility] Location (Use POS/Location); Future  - VENOUS COLLECTION    Primary hypertension  Cont lisinopril for now.  If related to prednisone will likely resolve and may be able to stop medication  - Basic metabolic  panel; Future  - lisinopril (ZESTRIL) 10 MG tablet; Take 1 tablet (10 mg) by mouth daily.  - Basic metabolic panel  - VENOUS COLLECTION    Follow up in 4-6 weeks, sooner if needed    65 minutes total time including chart review, exam and face to face time, and documentation.

## 2025-07-31 LAB
BACTERIA BRONCH: NORMAL
BACTERIA BRONCH: NORMAL

## 2025-08-03 LAB
BACTERIA BRONCH: NO GROWTH
BACTERIA BRONCH: NO GROWTH

## 2025-08-31 LAB
ACID FAST STAIN (ARUP): NORMAL

## (undated) DEVICE — SYR 10ML SLIP TIP W/O NDL

## (undated) DEVICE — TUBING OXYGEN CANNULA NASAL 7FT

## (undated) DEVICE — SPECIMEN CONTAINER 3OZ

## (undated) DEVICE — SPECIMEN TRAP MUCOUS SIMILAC NTRL CARE GRAD 80ML 0045860

## (undated) DEVICE — DRSG TELFA 3X8" 1238

## (undated) DEVICE — ATOMIZER MUCOSAL MEMBRANE MAD100

## (undated) DEVICE — SOL NACL 0.9% IRRIG 1000ML BOTTLE 2F7124

## (undated) DEVICE — ENDO NDL ASPIRATION EXCELON TRANSBRONCH 21GA 15MM

## (undated) DEVICE — TUBING SUCTION 12"X1/4" N612

## (undated) DEVICE — APPLICATOR COTTON TIP 6"X2 STERILE LF 6012

## (undated) DEVICE — GLOVE BIOGEL 6.5 LATEX

## (undated) RX ORDER — LIDOCAINE HYDROCHLORIDE 20 MG/ML
JELLY TOPICAL
Status: DISPENSED
Start: 2025-07-06

## (undated) RX ORDER — LIDOCAINE HYDROCHLORIDE 10 MG/ML
INJECTION, SOLUTION EPIDURAL; INFILTRATION; INTRACAUDAL; PERINEURAL
Status: DISPENSED
Start: 2025-07-06

## (undated) RX ORDER — FENTANYL CITRATE 50 UG/ML
INJECTION, SOLUTION INTRAMUSCULAR; INTRAVENOUS
Status: DISPENSED
Start: 2025-07-06

## (undated) RX ORDER — LIDOCAINE HYDROCHLORIDE AND EPINEPHRINE 10; 10 MG/ML; UG/ML
INJECTION, SOLUTION INFILTRATION; PERINEURAL
Status: DISPENSED
Start: 2025-07-06

## (undated) RX ORDER — LIDOCAINE HYDROCHLORIDE 40 MG/ML
INJECTION, SOLUTION RETROBULBAR
Status: DISPENSED
Start: 2025-07-06